# Patient Record
Sex: FEMALE | Race: BLACK OR AFRICAN AMERICAN | NOT HISPANIC OR LATINO | Employment: FULL TIME | URBAN - METROPOLITAN AREA
[De-identification: names, ages, dates, MRNs, and addresses within clinical notes are randomized per-mention and may not be internally consistent; named-entity substitution may affect disease eponyms.]

---

## 2017-11-07 ENCOUNTER — OFFICE VISIT (OUTPATIENT)
Dept: ORTHOPEDICS | Facility: CLINIC | Age: 42
End: 2017-11-07
Payer: COMMERCIAL

## 2017-11-07 ENCOUNTER — HOSPITAL ENCOUNTER (OUTPATIENT)
Dept: RADIOLOGY | Facility: HOSPITAL | Age: 42
Discharge: HOME | End: 2017-11-07
Attending: ORTHOPAEDIC SURGERY
Payer: COMMERCIAL

## 2017-11-07 VITALS
DIASTOLIC BLOOD PRESSURE: 72 MMHG | HEART RATE: 63 BPM | HEIGHT: 66 IN | SYSTOLIC BLOOD PRESSURE: 122 MMHG | WEIGHT: 158 LBS | BODY MASS INDEX: 25.39 KG/M2

## 2017-11-07 DIAGNOSIS — M25.531 RIGHT WRIST PAIN: ICD-10-CM

## 2017-11-07 DIAGNOSIS — M77.11 RIGHT TENNIS ELBOW: Primary | ICD-10-CM

## 2017-11-07 DIAGNOSIS — M25.531 RIGHT WRIST PAIN: Primary | ICD-10-CM

## 2017-11-07 PROCEDURE — 99214 OFFICE O/P EST MOD 30 MIN: CPT | Mod: S$GLB,,, | Performed by: ORTHOPAEDIC SURGERY

## 2017-11-07 PROCEDURE — 99999 PR PBB SHADOW E&M-EST. PATIENT-LVL III: CPT | Mod: PBBFAC,,, | Performed by: ORTHOPAEDIC SURGERY

## 2017-11-07 NOTE — PROGRESS NOTES
CC:Jovana is a 42 year old female that complains of right elbow pain.     HPI: The patient states that she has pain in the right elbow when extending her wrist.  The area of greatest pain is over the outer part of her right elbow.  She does experience periodic pain over the cubital tunnel area.  The patient did get great relief following the cubital tunnel release.    PMH:    Past Medical History:   Diagnosis Date    Anxiety     Arthralgia     Arthritis     Arthritis     osteo , and psoria    Diabetes mellitus type II     Panic attack        PSH:    Past Surgical History:   Procedure Laterality Date    PARTIAL HYSTERECTOMY      PELVIC LAPAROSCOPY      ROTATOR CUFF REPAIR      TUBAL LIGATION         Family Hx:    Family History   Problem Relation Age of Onset    Cancer Maternal Aunt     Cancer Maternal Grandfather        Allergy:    Review of patient's allergies indicates:   Allergen Reactions    Demerol [meperidine] Itching     Not able to urinate    Lortab [hydrocodone-acetaminophen] Itching     Hallucination       Medication:    Current Outpatient Prescriptions:     alprazolam (XANAX) 2 MG Tab, Take 0.5 mg by mouth nightly as needed. , Disp: , Rfl:     ibuprofen (ADVIL,MOTRIN) 600 MG tablet, Take 600 mg by mouth every 6 (six) hours as needed., Disp: , Rfl:     ibuprofen (ADVIL,MOTRIN) 800 MG tablet, , Disp: , Rfl: 1    meperidine (DEMEROL) 50 mg tablet, Take 1 tablet (50 mg total) by mouth every 4 (four) hours as needed for Pain., Disp: 40 tablet, Rfl: 0    nabumetone (RELAFEN) 500 MG tablet, Take 1 tablet (500 mg total) by mouth once daily., Disp: 30 tablet, Rfl: 2    oxycodone-acetaminophen  mg (PERCOCET)  mg per tablet, Take 1 tablet by mouth every 4 (four) hours as needed for Pain., Disp: 60 tablet, Rfl: 0    Social History:    Social History     Social History    Marital status:      Spouse name: N/A    Number of children: N/A    Years of education: N/A     Occupational  "History    Not on file.     Social History Main Topics    Smoking status: Never Smoker    Smokeless tobacco: Never Used    Alcohol use Yes      Comment: Occasionally    Drug use: No    Sexual activity: Not on file     Other Topics Concern    Not on file     Social History Narrative    No narrative on file       Vitals:   /72   Pulse 63   Ht 5' 6" (1.676 m)   Wt 71.7 kg (158 lb)   BMI 25.50 kg/m²      ROS:  GENERAL: No fever, chills, fatigability or weight loss.  SKIN: No rashes, itching or changes in color or texture of skin.  HEAD: No headaches or recent head trauma.  EYES: Visual acuity fine. No photophobia, ocular pain or diplopia.  EARS: Denies ear pain, discharge or vertigo.  NOSE: No loss of smell, no epistaxis or postnasal drip.  MOUTH & THROAT: No hoarseness or change in voice. No excessive gum bleeding.  NODES: Denies swollen glands.  CHEST: Denies COURTNEY, cyanosis, wheezing, cough and sputum production.  CARDIOVASCULAR: Denies chest pain, PND, orthopnea or reduced exercise tolerance.  ABDOMEN: Appetite fine. No weight loss. Denies diarrhea, abdominal pain, hematemesis or blood in stool.  URINARY: No flank pain, dysuria or hematuria.  PERIPHERAL VASCULAR: No claudication or cyanosis.  NEUROLOGIC: No history of seizures, paralysis, alteration of gait or coordination.  MUSCULOSKELETAL: See HPI    PE:  APPEARANCE: Well nourished, well developed, in no acute distress.   HEAD: Normocephalic, atraumatic.  EYES: PERRL. EOMI.   EARS: TM's intact. Light reflex normal. No retraction or perforation.   NOSE: Mucosa pink. Airway clear.  MOUTH & THROAT: No tonsillar enlargement. No pharyngeal erythema or exudate. No stridor.  NECK: Supple.   NODES: No cervical, axillary or inguinal lymph node enlargement.  CHEST: Lungs clear to auscultation.  CARDIOVASCULAR: Normal S1, S2. No rubs, murmurs or gallops.  ABDOMEN: Bowel sounds normal. Not distended. Soft. No tenderness or masses.  NEUROLOGIC: Cranial Nerves: " II-XII grossly intact, also see MUSCULOSKELETAL  MUSCULOSKELETAL:          Right Elbow- 2 plus radial  artery and ulnar artery pulses, light touch intact Right upper extremity.  All digits are warm. No erythema, no warmth, no drainage, No swelling,   Tenderness over the lateral epicondyle, pain in the lateral epicondyle with resisted wrist extension..      Assessment:           Diagnosis:              1.right lateral epicondylitis                    Diagnostic Studies  MRI-No  X-Ray-No  EMG/NCV-No  Arthrogram-No  Bone Scan-No  CT Scan-No  Doppler-No  ESR-No  CRP-No  CBC with Diff-No   Rheumatoid/Arthritis Panel-No      Plan:                                                 1. PT-no                                                 2.OT-yes                                          3.NSAID-yes                                        4. Narcotics-no                                     5. Wound care-No                                 6. Rest-yes                                           7. Surgery-no                                         8. SHARYN Hose-no                                    9. Anticoagulation therapy-no               10. Elevation-no                                     11. Crutches-no                                    12. Walker-no             13. Cane no                        14. Referral-no                                     15.Injection-no                            16. Splint   /    Cast   /   Cast Shoe-Yes              17. RICE -none            18. Follow up- 3 weeks

## 2017-11-07 NOTE — LETTER
November 7, 2017      [facility]  [address] [facility] [place] [address]  Phone: [phone]  Fax: [phone]       Patient: Fransisca Clifton   YOB: 1975  Date of Visit: 11/07/2017    To Whom It May Concern:    Marito Clifton  was at [facility] on 11/07/2017. She may return to work on 11/8/2017 with no restrictions. If you have any questions or concerns, or if I can be of further assistance, please do not hesitate to contact me.    Sincerely,    Alistair Vincent MD

## 2017-11-07 NOTE — PATIENT INSTRUCTIONS
Tendonitis  A tendon is the thick fibrous cord that joins muscle to bone and allows joints to move. When a tendon becomes inflamed, it is called tendonitis. This can occur from overuse, injury, or infection. This usually involves the shoulders, forearm, wrist, hands and foot. Symptoms include pain, swelling and tenderness to the touch. Moving the joint increases the pain.  It takes 4 to 6 weeks for tendonitis to heal. It is treated by preventing motion of the tendon with a splint or brace and the use of anti-inflammatory medicine.  Home care  · Some people find relief with ice packs. These can be crushed or cubed ice in a plastic bag or a bag of frozen vegetables wrapped in a thin towel. Other people get better relief with heat. This can include a hot shower, hot bath, or a moist towel warmed in a microwave. Try each and use the method that feels best, for 15 to 20 minutes several times a day.  · Rest the inflamed joint and protect it from movement.  · You may use over-the-counter ibuprofen or naproxen to treat pain and inflammation, unless another medicine was prescribed. If you can't take these medicines, acetaminophen may help with the pain, but does not treat inflammation. If you have chronic liver or kidney disease or ever had a stomach ulcer or gastrointestinal bleeding, talk with your doctor before using these medicines.  · As your symptoms improve, begin gradual motion at the involved joint.  Follow-up care  Follow up with your healthcare provider if you are not improving after 5 days of treatment.  When to seek medical advice  Call your healthcare provider right away if any of these occur:  · Redness over the painful area  · Increasing pain or swelling at the joint  · Fever (1 degree above your normal temperature) lasting 24 to 48 hours Or, whatever your healthcare provider told you to report based on your condition  Date Last Reviewed: 11/21/2015  © 9236-4772 [employer]. [address] [place] [address]  Road, [place] [address]. All rights reserved. This information is not intended as a substitute for professional medical care. Always follow your healthcare professional's instructions.

## 2017-11-17 ENCOUNTER — TELEPHONE (OUTPATIENT)
Dept: RADIOLOGY | Facility: HOSPITAL | Age: 42
End: 2017-11-17

## 2017-11-18 ENCOUNTER — HOSPITAL ENCOUNTER (OUTPATIENT)
Dept: RADIOLOGY | Facility: HOSPITAL | Age: 42
Discharge: HOME | End: 2017-11-18
Attending: ORTHOPAEDIC SURGERY
Payer: COMMERCIAL

## 2017-11-18 DIAGNOSIS — M25.531 RIGHT WRIST PAIN: ICD-10-CM

## 2017-11-18 PROCEDURE — 73221 MRI JOINT UPR EXTREM W/O DYE: CPT | Mod: 26,RT,, | Performed by: RADIOLOGY

## 2017-11-18 PROCEDURE — 73221 MRI JOINT UPR EXTREM W/O DYE: CPT | Mod: TC,PO,RT

## 2017-11-28 ENCOUNTER — OFFICE VISIT (OUTPATIENT)
Dept: ORTHOPEDICS | Facility: CLINIC | Age: 42
End: 2017-11-28
Payer: COMMERCIAL

## 2017-11-28 DIAGNOSIS — Z01.818 PRE-OP TESTING: Primary | ICD-10-CM

## 2017-11-28 DIAGNOSIS — G56.01 CARPAL TUNNEL SYNDROME OF RIGHT WRIST: Primary | ICD-10-CM

## 2017-11-28 DIAGNOSIS — M77.11 RIGHT LATERAL EPICONDYLITIS: ICD-10-CM

## 2017-11-28 DIAGNOSIS — G56.00 CARPAL TUNNEL SYNDROME, UNSPECIFIED LATERALITY: ICD-10-CM

## 2017-11-28 DIAGNOSIS — G56.21 ULNAR TUNNEL SYNDROME OF RIGHT WRIST: ICD-10-CM

## 2017-11-28 DIAGNOSIS — M67.40 GANGLION CYST: ICD-10-CM

## 2017-11-28 DIAGNOSIS — G56.00 CARPAL TUNNEL SYNDROME, UNSPECIFIED LATERALITY: Primary | ICD-10-CM

## 2017-11-28 DIAGNOSIS — G56.20 ULNAR NERVE ENTRAPMENT, UNSPECIFIED LATERALITY: ICD-10-CM

## 2017-11-28 PROCEDURE — 99214 OFFICE O/P EST MOD 30 MIN: CPT | Mod: S$GLB,,, | Performed by: ORTHOPAEDIC SURGERY

## 2017-11-28 PROCEDURE — 99999 PR PBB SHADOW E&M-EST. PATIENT-LVL II: CPT | Mod: PBBFAC,,, | Performed by: ORTHOPAEDIC SURGERY

## 2017-11-28 RX ORDER — MELOXICAM 7.5 MG/1
7.5 TABLET ORAL DAILY
COMMUNITY
Start: 2017-08-16 | End: 2018-08-16

## 2017-11-28 RX ORDER — FLUCONAZOLE 150 MG/1
TABLET ORAL
Refills: 0 | COMMUNITY
Start: 2017-08-21 | End: 2022-02-02

## 2017-11-28 RX ORDER — ERGOCALCIFEROL 1.25 MG/1
50000 CAPSULE ORAL
COMMUNITY
Start: 2015-05-05

## 2017-11-28 RX ORDER — VALACYCLOVIR HYDROCHLORIDE 500 MG/1
500 TABLET, FILM COATED ORAL
COMMUNITY
Start: 2015-11-30 | End: 2022-02-02

## 2017-11-28 RX ORDER — ACETAMINOPHEN AND CODEINE PHOSPHATE 300; 60 MG/1; MG/1
TABLET ORAL EVERY 12 HOURS PRN
COMMUNITY
Start: 2017-01-30 | End: 2022-12-28

## 2017-11-28 RX ORDER — METRONIDAZOLE 500 MG/1
500 TABLET ORAL 2 TIMES DAILY PRN
Refills: 0 | COMMUNITY
Start: 2017-08-21

## 2017-11-28 RX ORDER — ALPRAZOLAM 2 MG/1
1 TABLET ORAL NIGHTLY PRN
COMMUNITY

## 2017-11-28 NOTE — PROGRESS NOTES
CC:Jovana is a 42 year old female that complains of right elbow, right wrist and hand pain.     HPI: The patient states that she has pain in the right elbow when extending her wrist.  The area of greatest pain is over the outer part of her right elbow.  She does experience periodic pain over the cubital tunnel area.  The patient did get great relief following the cubital tunnel release.     PMH:    Past Medical History:   Diagnosis Date    Anxiety     Arthralgia     Arthritis     Arthritis     osteo , and psoria    Diabetes mellitus type II     Panic attack        PSH:    Past Surgical History:   Procedure Laterality Date    PARTIAL HYSTERECTOMY      PELVIC LAPAROSCOPY      ROTATOR CUFF REPAIR      TUBAL LIGATION         Family Hx:    Family History   Problem Relation Age of Onset    Cancer Maternal Aunt     Cancer Maternal Grandfather        Allergy:    Review of patient's allergies indicates:   Allergen Reactions    Demerol [meperidine] Itching     Not able to urinate    Lortab [hydrocodone-acetaminophen] Itching     Hallucination       Medication:    Current Outpatient Prescriptions:     acetaminophen-codeine 300-60mg (TYLENOL #4) 300-60 mg Tab, Take by mouth every 12 (twelve) hours as needed., Disp: , Rfl:     alprazolam (XANAX) 2 MG Tab, Take 0.5 mg by mouth nightly as needed. , Disp: , Rfl:     ALPRAZolam (XANAX) 2 MG Tab, Take 1 mg by mouth nightly as needed., Disp: , Rfl:     ergocalciferol (ERGOCALCIFEROL) 50,000 unit Cap, Take 50,000 Units by mouth every 30 days., Disp: , Rfl:     fluconazole (DIFLUCAN) 150 MG Tab, Take by mouth as needed., Disp: , Rfl: 0    meloxicam (MOBIC) 7.5 MG tablet, Take 7.5 mg by mouth once daily., Disp: , Rfl:     metroNIDAZOLE (FLAGYL) 500 MG tablet, Take 500 mg by mouth 2 (two) times daily as needed., Disp: , Rfl: 0    nabumetone (RELAFEN) 500 MG tablet, Take 1 tablet (500 mg total) by mouth once daily., Disp: 30 tablet, Rfl: 2    valACYclovir (VALTREX) 500  MG tablet, Take 500 mg by mouth as needed., Disp: , Rfl:     Social History:    Social History     Social History    Marital status:      Spouse name: N/A    Number of children: N/A    Years of education: N/A     Occupational History    Not on file.     Social History Main Topics    Smoking status: Never Smoker    Smokeless tobacco: Never Used    Alcohol use Yes      Comment: Occasionally    Drug use: No    Sexual activity: Not on file     Other Topics Concern    Not on file     Social History Narrative    No narrative on file       Vitals:   There were no vitals taken for this visit.     ROS:  GENERAL: No fever, chills, fatigability or weight loss.  SKIN: No rashes, itching or changes in color or texture of skin.  HEAD: No headaches or recent head trauma.  EYES: Visual acuity fine. No photophobia, ocular pain or diplopia.  EARS: Denies ear pain, discharge or vertigo.  NOSE: No loss of smell, no epistaxis or postnasal drip.  MOUTH & THROAT: No hoarseness or change in voice. No excessive gum bleeding.  NODES: Denies swollen glands.  CHEST: Denies COURTNEY, cyanosis, wheezing, cough and sputum production.  CARDIOVASCULAR: Denies chest pain, PND, orthopnea or reduced exercise tolerance.  ABDOMEN: Appetite fine. No weight loss. Denies diarrhea, abdominal pain, hematemesis or blood in stool.  URINARY: No flank pain, dysuria or hematuria.  PERIPHERAL VASCULAR: No claudication or cyanosis.  NEUROLOGIC: No history of seizures, paralysis, alteration of gait or coordination.  MUSCULOSKELETAL: See HPI    PE:  APPEARANCE: Well nourished, well developed, in no acute distress.   HEAD: Normocephalic, atraumatic.  EYES: PERRL. EOMI.   EARS: TM's intact. Light reflex normal. No retraction or perforation.   NOSE: Mucosa pink. Airway clear.  MOUTH & THROAT: No tonsillar enlargement. No pharyngeal erythema or exudate. No stridor.  NECK: Supple.   NODES: No cervical, axillary or inguinal lymph node enlargement.  CHEST:  Lungs clear to auscultation.  CARDIOVASCULAR: Normal S1, S2. No rubs, murmurs or gallops.  ABDOMEN: Bowel sounds normal. Not distended. Soft. No tenderness or masses.  NEUROLOGIC: Cranial Nerves: II-XII grossly intact, also see MUSCULOSKELETAL  MUSCULOSKELETAL:          Right Elbow- 2 plus radial  artery and ulnar artery pulses, light touch intact Right upper extremity.  All digits are warm. No erythema, no warmth, no drainage, No swelling,   Tenderness over the lateral epicondyle, pain in the lateral epicondyle with resisted wrist extension..      Right  Wrist hand-      - Positive- Tinel's over the Median nerve  Positive- Phalen maneuver   Positive- Thenar atrophy   Negative- hypothenar atrophy   Positive- Median Nerve Compression test less than 30 seconds   Negative- Tinel's over Guyon's Canal   Negative- Tinel's over Cubital Tunnel Negative- Tinels over the Median Nerve overlying the antecubital  Fossa   Negative- Tinel's over Radial groove  Negative- Tinel's over sensory branch of Radial nerve at the wrist  Negative- Tinel's over the PIN   Negative- pain in forearm with resistance to             FDP flexion and resisted pronation   Positive- boggy synovium of the wrist   normal- less than 2 seconds capillary all digits  Positive- light touch intact in Median nerve distribution Positive- light touch in the Radial Nerve distribution  Positive- light touch intact in the Ulnar sensory nerve distribution    Positive- Thumb opposition strength symmetrical  Negative- bruit(aneurysm)    Positive- skin color intact(claudication/Raynaud's)  Negative- cold digits(claudication/Raynaud's)  normal - Kamron Test(Vascular Exam)  normal- +2 Radial and Ulnar artery pulses  Negative- anatomical snuff box tenderness(Dequervain's Synovitis)  Negative- finger or thumb triggering(Trigger Thumb or Finger)  Negative- Lipoma  Negative- Ganglion cyst  Positive-Tinel's ulnar tunnel      Sensory exam-C5,C6,C7,C8,T1- normal    Wrist  extension for radial nerve- +5/5  Wrist Flexion-+5/5  Wrist Ulnar Deviation-+5/5  Wrist Radial Deviation- +5/5  Resisted opposition of the thumb for the median nerve- +5/5  Digit abduction for the ulnar nerve- +5/5                 Assessment:  All the patient's questions were answered regarding her diagnosis and recommended treatment.  The patient understands that she would benefit from surgical intervention.  The risk and benefit of surgery has been discussed.           Diagnosis:              1.right lateral epicondylitis                2. Volar radial ganglion cyst                 3. Right carpal tunnel  syndrome                 4.  Right ulnar tunnel syndrome    Diagnostic Studies  MRI-No  X-Ray-No  EMG/NCV-yes, right upper extremity    Arthrogram-No  Bone Scan-No  CT Scan-No  Doppler-No  ESR-No  CRP-No  CBC with Diff-No   Rheumatoid/Arthritis Panel-No      Plan:                                                 1. PT-no                                                 2.OT-yes                                          3.NSAID-yes                                        4. Narcotics-no                                     5. Wound care-No                                 6. Rest-yes                                           7. Surgery-yes, right carpal tunnel release, volar ganglion cyst excision.                                         8. SHARYN Hose-no                                    9. Anticoagulation therapy-no               10. Elevation-no                                     11. Crutches-no                                    12. Walker-no             13. Cane no                        14. Referral-no                                     15.Injection-no                            16. Splint   /    Cast   /   Cast Shoe-Yes , custom splint for the right upper extremity as per occupational therapist             17. RICE -none            18. Follow up- 3 weeks

## 2017-11-28 NOTE — LETTER
November 28, 2017      Rip Junior MD  [address]  [place] [address]           [facility]  [address]  [place] [address]  Phone: [phone]  Fax: [phone]          Patient: Fransisca Clifton   MR Number: [id number]   YOB: 1975   Date of Visit: 11/28/2017       Dear Dr. Rip Junior:    Thank you for referring Fransisca Clifton to me for evaluation. Attached you will find relevant portions of my assessment and plan of care.    If you have questions, please do not hesitate to call me. I look forward to following Fransisca Clifton along with you.    Sincerely,    Alistair Vincent Sr., MD    Enclosure  CC:  No Recipients    If you would like to receive this communication electronically, please contact [email or web address] or [phone] to request more information on [employer] access.    For providers and/or their staff who would like to refer a patient to [facility], please contact us through our one-stop-shop provider referral line, [facility], at [phone].    If you feel you have received this communication in error or would no longer like to receive these types of communications, please e-mail [email or web address]

## 2017-11-29 ENCOUNTER — TELEPHONE (OUTPATIENT)
Dept: ORTHOPEDICS | Facility: CLINIC | Age: 42
End: 2017-11-29

## 2017-11-29 NOTE — TELEPHONE ENCOUNTER
Returned pt phone call. Pt had questions regarding upcoming surgery. All questions were answered. Pt vocalized understanding.

## 2017-11-29 NOTE — TELEPHONE ENCOUNTER
----- Message from Miladys Higginbotham sent at 11/29/2017  3:36 PM CST -----  Contact: Patient   Patient has some question about her procedure, Please call her at [phone].    Thanks  td

## 2017-12-11 ENCOUNTER — TELEPHONE (OUTPATIENT)
Dept: ORTHOPEDICS | Facility: CLINIC | Age: 42
End: 2017-12-11

## 2017-12-12 ENCOUNTER — HOSPITAL ENCOUNTER (OUTPATIENT)
Dept: RADIOLOGY | Facility: HOSPITAL | Age: 42
Discharge: HOME | End: 2017-12-12
Attending: ORTHOPAEDIC SURGERY
Payer: COMMERCIAL

## 2017-12-12 ENCOUNTER — CLINICAL SUPPORT (OUTPATIENT)
Dept: CARDIOLOGY | Facility: CLINIC | Age: 42
End: 2017-12-12
Payer: COMMERCIAL

## 2017-12-12 ENCOUNTER — TELEPHONE (OUTPATIENT)
Dept: ORTHOPEDICS | Facility: CLINIC | Age: 42
End: 2017-12-12

## 2017-12-12 DIAGNOSIS — Z01.818 PRE-OP TESTING: ICD-10-CM

## 2017-12-12 PROCEDURE — 93000 ELECTROCARDIOGRAM COMPLETE: CPT | Mod: S$GLB,,, | Performed by: INTERNAL MEDICINE

## 2017-12-12 PROCEDURE — 71020 XR CHEST PA AND LATERAL PRE-OP: CPT | Mod: 26,,, | Performed by: RADIOLOGY

## 2017-12-12 PROCEDURE — 71020 XR CHEST PA AND LATERAL PRE-OP: CPT | Mod: TC,PO

## 2017-12-12 NOTE — TELEPHONE ENCOUNTER
I have spoken with the patient, who has been made aware that her paper work is only awaiting a signature today by Dr. Vincent. The patient will be contacted after paperwork has been signed and faxed to employer. Pt verbalized understanding. -AS

## 2017-12-14 ENCOUNTER — TELEPHONE (OUTPATIENT)
Dept: ORTHOPEDICS | Facility: CLINIC | Age: 42
End: 2017-12-14

## 2017-12-14 ENCOUNTER — PATIENT MESSAGE (OUTPATIENT)
Dept: SURGERY | Facility: HOSPITAL | Age: 42
End: 2017-12-14

## 2017-12-14 NOTE — TELEPHONE ENCOUNTER
----- Message from Milena Burger sent at 12/14/2017  2:26 PM CST -----  Contact: Patient  Patient has a question about upcoming surgery and test results, please call her back at [phone]. Thank you

## 2017-12-15 ENCOUNTER — PATIENT MESSAGE (OUTPATIENT)
Dept: SURGERY | Facility: HOSPITAL | Age: 42
End: 2017-12-15

## 2017-12-18 ENCOUNTER — TELEPHONE (OUTPATIENT)
Dept: ORTHOPEDICS | Facility: CLINIC | Age: 42
End: 2017-12-18

## 2017-12-18 NOTE — TELEPHONE ENCOUNTER
Called pt to schedule nurse visit to sign consents for upcoming surgery. Pt vocalized understanding.

## 2017-12-18 NOTE — TELEPHONE ENCOUNTER
----- Message from Miladys Higginbotham sent at 12/18/2017  8:24 AM CST -----  Contact: Patient   Patient request a call back at [phone], Regards to her surgery.    Thanks  td

## 2017-12-20 NOTE — PRE ADMISSION SCREENING
Pre op instructions reviewed with patient per phone:    To confirm, Your surgeon has instructed you:  Surgery is scheduled 12/22/17 at 1215.      Please report to [facility] 1[facility] floor main lobby by 1045.   Pre admit office to call afternoon prior to surgery with final arrival time      INSTRUCTIONS IMPORTANT!!!  ¨ Do not eat, drink, or smoke after 12 midnight-including water. OK to brush teeth, no gum, candy or mints!    ¨ Take only these medicines with a small swallow of water-morning of surgery.  N/A      ____  Do not wear makeup, including mascara.  ____  No powder, lotions or creams to surgical area.  ____  Please remove all jewelry, including piercings and leave at home.  ____  No money or valuables needed. Please leave at home.  ____  Please bring identification and insurance information to hospital.  ____  If going home the same day, arrange for a ride home. You will not be able to   drive if Anesthesia was used.  ____  Children, under 12 years old, must remain in the waiting room with an adult.  They are not allowed in patient areas.  ____  Wear loose fitting clothing. Allow for dressings, bandages.  ____  Stop Aspirin, Ibuprofen, Motrin and Aleve at least 5-7 days before surgery, unless otherwise instructed by your doctor, or the nurse.   You MAY use Tylenol/acetaminophen until day of surgery.  ____  If you take diabetic medication, do not take am of surgery unless instructed by   Doctor.  ____ Stop taking any Fish Oil supplement or any Vitamins that contain Vitamin E at least 5 days prior to surgery.          Bathing Instructions-- The night before surgery and the morning prior to coming to the hospital:   -Do not shave the surgical area.   -Shower and wash your hair and body as usual with anti-bacterial  soap and shampoo.   -Rinse your hair and body completely.   -Use one packet of hibiclens to wash the surgical site (using your hand) gently for 5 minutes.  Do not scrub you skin  too hard.   -Do not use hibiclens on your head, face, or genitals.   -Do not wash with anti-bacterial soap after you use the hibiclens.   -Rinse your body thoroughly.   -Dry with clean, soft towel.  Do not use lotion, cream, deodorant, or powders on   the surgical site.    Use antibacterial soap in place of hibiclens if your surgery is on the head, face or genitals.         Surgical Site Infection    Prevention of surgical site infections:     -Keep incisions clean and dry.   -Do not soak/submerge incisions in water until completely healed.   -Do not apply lotions, powders, creams, or deodorants to site.   -Always make sure hands are cleaned with antibacterial soap/ alcohol-based   prior to touching the surgical site.  (This includes doctors, nurses, staff, and yourself.)    Signs and symptoms:   -Redness and pain around the area where you had surgery   -Drainage of cloudy fluid from your surgical wound   -Fever over 100.4  I have read or had read and explained to me, and understand the above information.

## 2017-12-22 ENCOUNTER — ANESTHESIA (OUTPATIENT)
Dept: SURGERY | Facility: HOSPITAL | Age: 42
End: 2017-12-22
Payer: COMMERCIAL

## 2017-12-22 ENCOUNTER — ANESTHESIA EVENT (OUTPATIENT)
Dept: SURGERY | Facility: HOSPITAL | Age: 42
End: 2017-12-22
Payer: COMMERCIAL

## 2017-12-22 ENCOUNTER — HOSPITAL ENCOUNTER (OUTPATIENT)
Facility: HOSPITAL | Age: 42
Discharge: HOME | End: 2017-12-22
Attending: ORTHOPAEDIC SURGERY | Admitting: ORTHOPAEDIC SURGERY
Payer: COMMERCIAL

## 2017-12-22 DIAGNOSIS — M67.40 GANGLION CYST: Primary | ICD-10-CM

## 2017-12-22 DIAGNOSIS — M67.431 GANGLION CYST OF WRIST, RIGHT: ICD-10-CM

## 2017-12-22 LAB
POCT GLUCOSE: 69 MG/DL (ref 70–110)
POCT GLUCOSE: 89 MG/DL (ref 70–110)

## 2017-12-22 PROCEDURE — 37000008 HC ANESTHESIA 1ST 15 MINUTES: Performed by: ORTHOPAEDIC SURGERY

## 2017-12-22 PROCEDURE — 64721 CARPAL TUNNEL SURGERY: CPT | Mod: 51,22,RT, | Performed by: ORTHOPAEDIC SURGERY

## 2017-12-22 PROCEDURE — 26160 REMOVE TENDON SHEATH LESION: CPT | Mod: 51,RT,, | Performed by: ORTHOPAEDIC SURGERY

## 2017-12-22 PROCEDURE — 63600175 PHARM REV CODE 636 W HCPCS: Performed by: ORTHOPAEDIC SURGERY

## 2017-12-22 PROCEDURE — 63600175 PHARM REV CODE 636 W HCPCS: Performed by: ANESTHESIOLOGY

## 2017-12-22 PROCEDURE — 36000707: Performed by: ORTHOPAEDIC SURGERY

## 2017-12-22 PROCEDURE — C1729 CATH, DRAINAGE: HCPCS | Performed by: ORTHOPAEDIC SURGERY

## 2017-12-22 PROCEDURE — 36000706: Performed by: ORTHOPAEDIC SURGERY

## 2017-12-22 PROCEDURE — 71000039 HC RECOVERY, EACH ADD'L HOUR: Performed by: ORTHOPAEDIC SURGERY

## 2017-12-22 PROCEDURE — 63600175 PHARM REV CODE 636 W HCPCS: Performed by: NURSE ANESTHETIST, CERTIFIED REGISTERED

## 2017-12-22 PROCEDURE — 64718 REVISE ULNAR NERVE AT ELBOW: CPT | Mod: RT,,, | Performed by: ORTHOPAEDIC SURGERY

## 2017-12-22 PROCEDURE — 88305 TISSUE EXAM BY PATHOLOGIST: CPT | Performed by: PATHOLOGY

## 2017-12-22 PROCEDURE — 25000003 PHARM REV CODE 250: Performed by: ANESTHESIOLOGY

## 2017-12-22 PROCEDURE — 88305 TISSUE EXAM BY PATHOLOGIST: CPT | Mod: 26,,, | Performed by: PATHOLOGY

## 2017-12-22 PROCEDURE — 88304 TISSUE EXAM BY PATHOLOGIST: CPT | Mod: 26,,, | Performed by: PATHOLOGY

## 2017-12-22 PROCEDURE — 71000015 HC POSTOP RECOV 1ST HR: Performed by: ORTHOPAEDIC SURGERY

## 2017-12-22 PROCEDURE — 71000033 HC RECOVERY, INTIAL HOUR: Performed by: ORTHOPAEDIC SURGERY

## 2017-12-22 PROCEDURE — 25000003 PHARM REV CODE 250: Performed by: ORTHOPAEDIC SURGERY

## 2017-12-22 PROCEDURE — 64718 REVISE ULNAR NERVE AT ELBOW: CPT | Mod: AS,RT,, | Performed by: PHYSICIAN ASSISTANT

## 2017-12-22 PROCEDURE — 37000009 HC ANESTHESIA EA ADD 15 MINS: Performed by: ORTHOPAEDIC SURGERY

## 2017-12-22 RX ORDER — LIDOCAINE HCL/PF 100 MG/5ML
SYRINGE (ML) INTRAVENOUS
Status: DISCONTINUED | OUTPATIENT
Start: 2017-12-22 | End: 2017-12-22

## 2017-12-22 RX ORDER — SODIUM CHLORIDE, SODIUM LACTATE, POTASSIUM CHLORIDE, CALCIUM CHLORIDE 600; 310; 30; 20 MG/100ML; MG/100ML; MG/100ML; MG/100ML
INJECTION, SOLUTION INTRAVENOUS CONTINUOUS
Status: DISCONTINUED | OUTPATIENT
Start: 2017-12-22 | End: 2017-12-22 | Stop reason: HOSPADM

## 2017-12-22 RX ORDER — ACETAMINOPHEN 10 MG/ML
INJECTION, SOLUTION INTRAVENOUS
Status: DISCONTINUED | OUTPATIENT
Start: 2017-12-22 | End: 2017-12-22

## 2017-12-22 RX ORDER — BUPIVACAINE HYDROCHLORIDE 2.5 MG/ML
INJECTION, SOLUTION EPIDURAL; INFILTRATION; INTRACAUDAL
Status: DISCONTINUED | OUTPATIENT
Start: 2017-12-22 | End: 2017-12-22 | Stop reason: HOSPADM

## 2017-12-22 RX ORDER — MEPERIDINE HYDROCHLORIDE 50 MG/1
50 TABLET ORAL EVERY 4 HOURS PRN
Qty: 60 TABLET | Refills: 0 | Status: SHIPPED | OUTPATIENT
Start: 2017-12-22 | End: 2019-03-28

## 2017-12-22 RX ORDER — LIDOCAINE HYDROCHLORIDE 10 MG/ML
INJECTION, SOLUTION EPIDURAL; INFILTRATION; INTRACAUDAL; PERINEURAL
Status: DISCONTINUED | OUTPATIENT
Start: 2017-12-22 | End: 2017-12-22 | Stop reason: HOSPADM

## 2017-12-22 RX ORDER — PROPOFOL 10 MG/ML
VIAL (ML) INTRAVENOUS
Status: DISCONTINUED | OUTPATIENT
Start: 2017-12-22 | End: 2017-12-22

## 2017-12-22 RX ORDER — CHLORHEXIDINE GLUCONATE ORAL RINSE 1.2 MG/ML
10 SOLUTION DENTAL 2 TIMES DAILY
Status: DISCONTINUED | OUTPATIENT
Start: 2017-12-22 | End: 2017-12-22 | Stop reason: HOSPADM

## 2017-12-22 RX ORDER — CEFAZOLIN SODIUM 2 G/50ML
2 SOLUTION INTRAVENOUS
Status: DISCONTINUED | OUTPATIENT
Start: 2017-12-22 | End: 2017-12-22 | Stop reason: HOSPADM

## 2017-12-22 RX ORDER — HYDROCODONE BITARTRATE AND ACETAMINOPHEN 5; 325 MG/1; MG/1
1 TABLET ORAL EVERY 4 HOURS PRN
Status: DISCONTINUED | OUTPATIENT
Start: 2017-12-22 | End: 2017-12-22 | Stop reason: HOSPADM

## 2017-12-22 RX ORDER — DIPHENHYDRAMINE HYDROCHLORIDE 50 MG/ML
25 INJECTION INTRAMUSCULAR; INTRAVENOUS EVERY 6 HOURS PRN
Status: DISCONTINUED | OUTPATIENT
Start: 2017-12-22 | End: 2017-12-22 | Stop reason: HOSPADM

## 2017-12-22 RX ORDER — ONDANSETRON 2 MG/ML
4 INJECTION INTRAMUSCULAR; INTRAVENOUS DAILY PRN
Status: DISCONTINUED | OUTPATIENT
Start: 2017-12-22 | End: 2017-12-22 | Stop reason: HOSPADM

## 2017-12-22 RX ORDER — SODIUM CHLORIDE 9 MG/ML
INJECTION, SOLUTION INTRAVENOUS CONTINUOUS
Status: DISCONTINUED | OUTPATIENT
Start: 2017-12-22 | End: 2017-12-22 | Stop reason: HOSPADM

## 2017-12-22 RX ORDER — CHLORHEXIDINE GLUCONATE ORAL RINSE 1.2 MG/ML
10 SOLUTION DENTAL
Status: DISCONTINUED | OUTPATIENT
Start: 2017-12-22 | End: 2017-12-22 | Stop reason: HOSPADM

## 2017-12-22 RX ORDER — HYDROMORPHONE HYDROCHLORIDE 1 MG/ML
0.2 INJECTION, SOLUTION INTRAMUSCULAR; INTRAVENOUS; SUBCUTANEOUS EVERY 5 MIN PRN
Status: DISCONTINUED | OUTPATIENT
Start: 2017-12-22 | End: 2017-12-22 | Stop reason: HOSPADM

## 2017-12-22 RX ORDER — MIDAZOLAM HYDROCHLORIDE 1 MG/ML
INJECTION, SOLUTION INTRAMUSCULAR; INTRAVENOUS
Status: DISCONTINUED | OUTPATIENT
Start: 2017-12-22 | End: 2017-12-22

## 2017-12-22 RX ORDER — FENTANYL CITRATE 50 UG/ML
INJECTION, SOLUTION INTRAMUSCULAR; INTRAVENOUS
Status: DISCONTINUED | OUTPATIENT
Start: 2017-12-22 | End: 2017-12-22

## 2017-12-22 RX ORDER — HYDROMORPHONE HYDROCHLORIDE 2 MG/1
2 TABLET ORAL EVERY 4 HOURS PRN
Qty: 90 TABLET | Refills: 0 | Status: SHIPPED | OUTPATIENT
Start: 2017-12-22 | End: 2019-03-28

## 2017-12-22 RX ORDER — FENTANYL CITRATE 50 UG/ML
25 INJECTION, SOLUTION INTRAMUSCULAR; INTRAVENOUS EVERY 5 MIN PRN
Status: COMPLETED | OUTPATIENT
Start: 2017-12-22 | End: 2017-12-22

## 2017-12-22 RX ADMIN — FENTANYL CITRATE 25 MCG: 50 INJECTION, SOLUTION INTRAMUSCULAR; INTRAVENOUS at 11:12

## 2017-12-22 RX ADMIN — CEFAZOLIN SODIUM 2 G: 2 SOLUTION INTRAVENOUS at 11:12

## 2017-12-22 RX ADMIN — FENTANYL CITRATE 25 MCG: 50 INJECTION INTRAMUSCULAR; INTRAVENOUS at 01:12

## 2017-12-22 RX ADMIN — ONDANSETRON 4 MG: 2 INJECTION, SOLUTION INTRAMUSCULAR; INTRAVENOUS at 02:12

## 2017-12-22 RX ADMIN — DIPHENHYDRAMINE HYDROCHLORIDE 25 MG: 50 INJECTION, SOLUTION INTRAMUSCULAR; INTRAVENOUS at 01:12

## 2017-12-22 RX ADMIN — FENTANYL CITRATE 25 MCG: 50 INJECTION, SOLUTION INTRAMUSCULAR; INTRAVENOUS at 12:12

## 2017-12-22 RX ADMIN — FENTANYL CITRATE 50 MCG: 50 INJECTION, SOLUTION INTRAMUSCULAR; INTRAVENOUS at 12:12

## 2017-12-22 RX ADMIN — SODIUM CHLORIDE, SODIUM LACTATE, POTASSIUM CHLORIDE, AND CALCIUM CHLORIDE: 600; 310; 30; 20 INJECTION, SOLUTION INTRAVENOUS at 12:12

## 2017-12-22 RX ADMIN — ACETAMINOPHEN 1000 MG: 10 INJECTION, SOLUTION INTRAVENOUS at 11:12

## 2017-12-22 RX ADMIN — PROPOFOL 150 MG: 10 INJECTION, EMULSION INTRAVENOUS at 11:12

## 2017-12-22 RX ADMIN — PROPOFOL 50 MG: 10 INJECTION, EMULSION INTRAVENOUS at 12:12

## 2017-12-22 RX ADMIN — SODIUM CHLORIDE, SODIUM LACTATE, POTASSIUM CHLORIDE, AND CALCIUM CHLORIDE: 600; 310; 30; 20 INJECTION, SOLUTION INTRAVENOUS at 10:12

## 2017-12-22 RX ADMIN — LIDOCAINE HYDROCHLORIDE 75 MG: 20 INJECTION, SOLUTION INTRAVENOUS at 11:12

## 2017-12-22 RX ADMIN — HYDROCODONE BITARTRATE AND ACETAMINOPHEN 1 TABLET: 5; 325 TABLET ORAL at 02:12

## 2017-12-22 RX ADMIN — MIDAZOLAM HYDROCHLORIDE 2 MG: 1 INJECTION, SOLUTION INTRAMUSCULAR; INTRAVENOUS at 10:12

## 2017-12-22 RX ADMIN — FENTANYL CITRATE 50 MCG: 50 INJECTION, SOLUTION INTRAMUSCULAR; INTRAVENOUS at 11:12

## 2017-12-22 RX ADMIN — HYDROMORPHONE HYDROCHLORIDE 0.2 MG: 1 INJECTION, SOLUTION INTRAMUSCULAR; INTRAVENOUS; SUBCUTANEOUS at 02:12

## 2017-12-22 NOTE — OP NOTE
OPERATIVE DICTATION:    DATE OF SERVICE: 12/22/2017      Preoperative Diagnosis:  Right hand ganglion cyst, right Carpal tunnel syndrome, right cubital tunnel syndrome, medial elbow skin defect    Postoperative Diagnosis:   Right hand ganglion cyst, right Carpal tunnel syndrome, right cubital tunnel syndrome, medial elbow skin defect, wrist sprain with capsular defect    Procedure: Right hand ganglion cyst excision, right Carpal tunnel release, right cubital tunnel release, flexor digitorum tenosynovectomy 2 through 5,  Soft tissue rearrangement of right elbow measuring 1 x 3 cm.  Capsulorrhaphy of the radiocarpal  joint    Indication for surgery:   Right hand ganglion cyst, right Carpal tunnel syndrome, right cubital tunnel syndrome, skin defect    Anesthesia:  Gen. anesthesia    Complications:  none    Surgeon: Alistair Vincent M.D.     Specimen:tenosynovium right wrist and ganglion cyst    Assistant:BRANDI Tomlinson. His assistance was critical and necesssary with positioning of the extremity throughout the surgical procedure.The physician assistant allowed me to access areas of the right upper extremity that could not be possible without the assistance of a trained orthopedic physician assistant.  His assistance was critical to allowing me to provide the highest level of care to the patient.      Operative procedure:  Right hand ganglion cyst excision, right Carpal tunnel release, right cubital tunnel release, flexor digitorum tenosynovectomy 2 through 5    OPERATIVE PROCEDURE IN DETAIL: The patient was brought to the Operating Room   after appropriate consent, placed under general anesthesia with intubation. The  patient was placed in a supine position. The Right  upper extremity prepped with  alcohol and chlorhexidine and sterilely draped. The Esmarch used for   exsanguination after the timeout was performed. The patient did receive IV   antibiotics prior to placement of the tourniquet. The Esmarch used for    exsanguination and the tourniquet inflated to 250 mmHg pressure.        Operation #1:  There was an incision overlying the cubital tunnel was reopened.  The 1 x 3 cm scar was excised.  The dissection was carried through the subcutaneous tissue, very gently.  The ulnar nerve was then used proximal to the incision site at the medial intermuscular septum.  The ulnar nerve was then released fully to its arborization.  The nerve noted to be decompressed quite well.  The patient had an abundance of scar tissue constricting the nerve.  Irrigation was performed and these overlying skin opposed with a running 3-0 nylon suture.        Operation #2:  The attention was turned to the volar aspect of the palm just distal to he   wrist crease. A curved incision was made just ulnar to the thenar crease and   distal to the wrist crease extending 3 cm. Dissection carried through the   subcutaneous tissue down to the palmar fascia, which was then incised overlying   the carpal canal. The sensory branch of median nerve was preserved. The   patient noted to have compression of the median nerve at the central portion of   the carpal canal. The patient noted to have thick and fibrotic tenosynovium   surrounding the flexor digitorum 2 through 5, which was excised and   submitted for pathological specimen. There was no evidence of vascular or mass   within the carpal canal. Irrigation was thoroughly performed.     Operation #3   The patient noted to have a cyst embedded deep to the abductor pollicis musculature.  The cyst extended down to the radiocarpal joint with a stalk perforating the capsule and creating a defect within the capsule.  The cyst was 1 x 1 cm.  It was excised en bloc and submitted for pathological specimen.  Irrigation was performed and the defect within the radiocarpal joint was repaired using interrupted 3-0 Vicryl suture.  The repair noted to be quite stable.  The overlying   skin opposed with a running 3-0 nylon  suture. Betadine applied to the incision   site. Sterile gauze applied. An Ace wrap applied to the right upper extremity.  The tourniquet deflated. The patient noted to have less than 2 seconds   capillary refill in all digits.  The patient was injected with 25% Marcaine plain and 1% Xylocaine plain.  The incision sites.  She was placed in a posterior splint.  The patient tolerated the procedure well and   left the Operating Room in good condition.    MD Alistair Turner M.D.

## 2017-12-22 NOTE — H&P
CC:This is a 42 year old female that complains of right elbow, right wrist and hand pain.             Patient continues to complain of numbness and tingling in all digits and time.  She does have numbness and tingling in the fourth and fourth fifth digit on palpation of the medial elbow over the area of the cubital tunnel.     HPI: The patient states that she has pain in the right elbow when extending her wrist.  The area of greatest pain is over the outer part of her right elbow.  She does experience periodic pain over the cubital tunnel area.  The patient did get great relief following the cubital tunnel release.  The symptoms have been persistent for over 6 months and is interfering with the patient activities of different daily living.     PMH:         Past Medical History:   Diagnosis Date    Anxiety      Arthralgia      Arthritis      Arthritis       osteo , and psoria    Diabetes mellitus type II      Panic attack           PSH:          Past Surgical History:   Procedure Laterality Date    PARTIAL HYSTERECTOMY        PELVIC LAPAROSCOPY        ROTATOR CUFF REPAIR        TUBAL LIGATION             Family Hx:          Family History   Problem Relation Age of Onset    Cancer Maternal Aunt      Cancer Maternal Grandfather           Allergy:          Review of patient's allergies indicates:   Allergen Reactions    Demerol [meperidine] Itching       Not able to urinate    Lortab [hydrocodone-acetaminophen] Itching       Hallucination         Medication:    Current Outpatient Prescriptions:     acetaminophen-codeine 300-60mg (TYLENOL #4) 300-60 mg Tab, Take by mouth every 12 (twelve) hours as needed., Disp: , Rfl:     alprazolam (XANAX) 2 MG Tab, Take 0.5 mg by mouth nightly as needed. , Disp: , Rfl:     ALPRAZolam (XANAX) 2 MG Tab, Take 1 mg by mouth nightly as needed., Disp: , Rfl:     ergocalciferol (ERGOCALCIFEROL) 50,000 unit Cap, Take 50,000 Units by mouth every 30 days., Disp: , Rfl:      fluconazole (DIFLUCAN) 150 MG Tab, Take by mouth as needed., Disp: , Rfl: 0    meloxicam (MOBIC) 7.5 MG tablet, Take 7.5 mg by mouth once daily., Disp: , Rfl:     metroNIDAZOLE (FLAGYL) 500 MG tablet, Take 500 mg by mouth 2 (two) times daily as needed., Disp: , Rfl: 0    nabumetone (RELAFEN) 500 MG tablet, Take 1 tablet (500 mg total) by mouth once daily., Disp: 30 tablet, Rfl: 2    valACYclovir (VALTREX) 500 MG tablet, Take 500 mg by mouth as needed., Disp: , Rfl:      Social History:    Social History   Social History            Social History    Marital status:        Spouse name: N/A    Number of children: N/A    Years of education: N/A          Occupational History    Not on file.             Social History Main Topics    Smoking status: Never Smoker    Smokeless tobacco: Never Used    Alcohol use Yes         Comment: Occasionally    Drug use: No    Sexual activity: Not on file           Other Topics Concern    Not on file          Social History Narrative    No narrative on file            Vitals:   There were no vitals taken for this visit.      ROS:  GENERAL: No fever, chills, fatigability or weight loss.  SKIN: No rashes, itching or changes in color or texture of skin.  HEAD: No headaches or recent head trauma.  EYES: Visual acuity fine. No photophobia, ocular pain or diplopia.  EARS: Denies ear pain, discharge or vertigo.  NOSE: No loss of smell, no epistaxis or postnasal drip.  MOUTH & THROAT: No hoarseness or change in voice. No excessive gum bleeding.  NODES: Denies swollen glands.  CHEST: Denies COURTNEY, cyanosis, wheezing, cough and sputum production.  CARDIOVASCULAR: Denies chest pain, PND, orthopnea or reduced exercise tolerance.  ABDOMEN: Appetite fine. No weight loss. Denies diarrhea, abdominal pain, hematemesis or blood in stool.  URINARY: No flank pain, dysuria or hematuria.  PERIPHERAL VASCULAR: No claudication or cyanosis.  NEUROLOGIC: No history of seizures, paralysis,  alteration of gait or coordination.  MUSCULOSKELETAL: See HPI     PE:  APPEARANCE: Well nourished, well developed, in no acute distress.   HEAD: Normocephalic, atraumatic.  EYES: PERRL. EOMI.   EARS: TM's intact. Light reflex normal. No retraction or perforation.   NOSE: Mucosa pink. Airway clear.  MOUTH & THROAT: No tonsillar enlargement. No pharyngeal erythema or exudate. No stridor.  NECK: Supple.   NODES: No cervical, axillary or inguinal lymph node enlargement.  CHEST: Lungs clear to auscultation.  CARDIOVASCULAR: Normal S1, S2. No rubs, murmurs or gallops.  ABDOMEN: Bowel sounds normal. Not distended. Soft. No tenderness or masses.  NEUROLOGIC: Cranial Nerves: II-XII grossly intact, also see MUSCULOSKELETAL  MUSCULOSKELETAL:           Right Elbow- 2 plus radial  artery and ulnar artery pulses, light touch intact Right upper extremity.  All digits are warm. No erythema, no warmth, no drainage, No swelling,   Tenderness over the lateral epicondyle, pain in the lateral epicondyle with resisted wrist extension..        Right  Wrist hand-       - Positive- Tinel's over the Median nerve  Positive- Phalen maneuver   Positive- Thenar atrophy   Negative- hypothenar atrophy   Positive- Median Nerve Compression test less than 30 seconds   Negative- Tinel's over Guyon's Canal   Negative- Tinel's over Cubital Tunnel Negative- Tinels over the Median Nerve overlying the antecubital  Fossa   Negative- Tinel's over Radial groove  Negative- Tinel's over sensory branch of Radial nerve at the wrist  Negative- Tinel's over the PIN   Negative- pain in forearm with resistance to             FDP flexion and resisted pronation   Positive- boggy synovium of the wrist   normal- less than 2 seconds capillary all digits  Positive- light touch intact in Median nerve distribution Positive- light touch in the Radial Nerve distribution  Positive- light touch intact in the Ulnar sensory nerve distribution    Positive- Thumb opposition strength  symmetrical  Negative- bruit(aneurysm)    Positive- skin color intact(claudication/Raynaud's)  Negative- cold digits(claudication/Raynaud's)  normal - Kamron Test(Vascular Exam)  normal- +2 Radial and Ulnar artery pulses  Negative- anatomical snuff box tenderness(Dequervain's Synovitis)  Negative- finger or thumb triggering(Trigger Thumb or Finger)  Negative- Lipoma  Negative- Ganglion cyst  Positive-Tinel's ulnar tunnel        Sensory exam-C5,C6,C7,C8,T1- normal     Wrist extension for radial nerve- +5/5  Wrist Flexion-+5/5  Wrist Ulnar Deviation-+5/5  Wrist Radial Deviation- +5/5  Resisted opposition of the thumb for the median nerve- +5/5  Digit abduction for the ulnar nerve- +5/5                     Assessment:  The patient continues to have persistent paresthesias and a positive Tinel's over the cubital tunnel.  She does understand that the ganglion cyst over the volar aspect of the proximal thenar eminence could be emanating from the carpal canal which can cause traction on the volar carpal ligament, which can cause the paresthesias in all 5 digits.  All the patient's questions were answered regarding her diagnosis and recommended treatment.  The patient understands that she would benefit from surgical intervention.  The risk and benefit of surgery has been discussed.            Diagnosis:              1.right lateral epicondylitis                2. Volar radial ganglion cyst                 3. Right carpal tunnel  syndrome                 4.  Right ulnar tunnel syndrome     Diagnostic Studies  MRI-No  X-Ray-No  EMG/NCV-yes, right upper extremity    Arthrogram-No  Bone Scan-No  CT Scan-No  Doppler-No  ESR-No  CRP-No  CBC with Diff-No   Rheumatoid/Arthritis Panel-No        Plan:                                                  1. PT-no                                                 2.OT-yes                                          3.NSAID-yes                                        4. Narcotics-no                                      5. Wound care-No                                 6. Rest-yes                                           7. Surgery-yes, right carpal tunnel release, volar ganglion cyst excision.  right cubital tunnel release                                         8. SHARYN Hose-no                                    9. Anticoagulation therapy-no               10. Elevation-no                                     11. Crutches-no                                    12. Walker-no             13. Cane no                        14. Referral-no                                     15.Injection-no                            16. Splint -none            17. RICE -none            18. Follow up- 3 weeks

## 2017-12-22 NOTE — ANESTHESIA PREPROCEDURE EVALUATION
12/22/2017  Fransisca Clifton is a 42 y.o., female.    Pre-op Assessment    I have reviewed the Patient Summary Reports.     I have reviewed the Nursing Notes.   I have reviewed the Medications.     Review of Systems  Anesthesia Hx:  No problems with previous Anesthesia  Denies Family Hx of Anesthesia complications.   Denies Personal Hx of Anesthesia complications.   Social:  Non-Smoker    Cardiovascular:  Cardiovascular Normal     Pulmonary:  Pulmonary Normal    Renal/:  Renal/ Normal     Hepatic/GI:  Hepatic/GI Normal    Musculoskeletal:   Arthritis     Neurological:   Neuromuscular Disease,    Endocrine:   Diabetes, well controlled, type 2        Physical Exam  General:  Obesity    Airway/Jaw/Neck:  Airway Findings: Mouth Opening: Normal General Airway Assessment: Adult  Mallampati: II       Chest/Lungs:  Chest/Lungs Findings: Clear to auscultation     Heart/Vascular:  Heart Findings: Rate: Normal  Rhythm: Regular Rhythm             Anesthesia Plan  Type of Anesthesia, risks & benefits discussed:  Anesthesia Type:  general  Patient's Preference:   Intra-op Monitoring Plan:   Intra-op Monitoring Plan Comments:   Post Op Pain Control Plan:   Post Op Pain Control Plan Comments:   Induction:   IV  Beta Blocker:  Patient is not currently on a Beta-Blocker (No further documentation required).       Informed Consent: Patient understands risks and agrees with Anesthesia plan.  Questions answered. Anesthesia consent signed with patient.  ASA Score: 2     Day of Surgery Review of History & Physical: I have interviewed and examined the patient. I have reviewed the patient's H&P dated:  There are no significant changes.

## 2017-12-22 NOTE — PLAN OF CARE
POC and discharge instructions discussed with pt & mother. Handouts given. Both verbalized understanding. VSS.

## 2017-12-22 NOTE — TRANSFER OF CARE
"Anesthesia Transfer of Care Note    Patient: Fransisca Clifton    Procedure(s) Performed: Procedure(s) (LRB):  GANGLIONECTOMY-HAND-right (Right)  RELEASE-CARPAL TUNNEL (Right)  RELEASE-CUBITAL TUNNEL (Right)    Patient location: PACU    Anesthesia Type: general    Transport from OR: Transported from OR on room air with adequate spontaneous ventilation    Post pain: adequate analgesia    Post assessment: no apparent anesthetic complications    Post vital signs: stable    Level of consciousness: awake    Nausea/Vomiting: no nausea/vomiting    Complications: none    Transfer of care protocol was followed      Last vitals:   Visit Vitals  /79 (BP Location: Right arm, Patient Position: Sitting)   Pulse 71   Temp 36.8 °C (98.2 °F) (Skin)   Resp 16   Ht 5' 6.5" (1.689 m)   Wt 73.4 kg (161 lb 13.1 oz)   SpO2 100%   Breastfeeding? No   BMI 25.73 kg/m²     "

## 2017-12-22 NOTE — DISCHARGE INSTRUCTIONS
General Information:  1.  Do not drink alcoholic beverages including beer for 24 hours or as long as you are on pain medication..  2.  Do not drive a motor vehicle, operate machinery or power tools, or signs legal papers for 24 hours or as long as you are on pain medication.   3.  You may experience light-headedness, dizziness, and sleepiness following surgery. Please do not stay alone. A responsible adult should be with you for this 24 hour period.  4.  Go home and rest.  5. Progress slowly to a normal diet unless instructed.  Otherwise, begin with liquids such as soft drinks, then soup and crackers working up to solid foods. Drink plenty of nonalcoholic fluids.  6.  Certain anesthetics and pain medications produce nausea and vomiting in certain       individuals. If nausea becomes a problem at home, call you doctor.  7. A nurse will be calling you sometime after surgery. Do not be alarmed. This is our way of finding out how you are doing.  8. Several times every hour while you are awake, take 2-3 deep breaths and cough. If you had stomach surgery hold a pillow or rolled towel firmly against your stomach before you cough. This will help with any pain the cough might cause.  9. Several times every hour while you are awake, pump and flex your feet 5-6 times and do foot circles. This will help prevent blood clots.  10.Call your doctor for severe pain, bleeding, fever, or signs or symptoms of infection (pain, swelling, redness, foul odor, drainage).  11.You can contact your doctor anytime by [phone] for the [facility] (at [facility]) or [phone] for the [facility] on [facility].   [email or web address] is another way to contact your doctor if you are an active participant online with [facility].

## 2017-12-23 NOTE — ANESTHESIA POSTPROCEDURE EVALUATION
"Anesthesia Post Evaluation    Patient: Fransisca Clifton    Procedure(s) Performed: Procedure(s) (LRB):  GANGLIONECTOMY-HAND-right (Right)  RELEASE-CARPAL TUNNEL (Right)  RELEASE-CUBITAL TUNNEL (Right)    Final Anesthesia Type: general  Patient location during evaluation: PACU  Patient participation: Yes- Able to Participate  Level of consciousness: awake and alert  Post-procedure vital signs: reviewed and stable  Pain management: adequate  Airway patency: patent  PONV status at discharge: No PONV  Anesthetic complications: no      Cardiovascular status: blood pressure returned to baseline  Respiratory status: unassisted and spontaneous ventilation  Hydration status: euvolemic  Follow-up not needed.        Visit Vitals  /70   Pulse 70   Temp 36.3 °C (97.3 °F) (Temporal)   Resp 16   Ht 5' 6.5" (1.689 m)   Wt 73.4 kg (161 lb 13.1 oz)   SpO2 99%   Breastfeeding? No   BMI 25.73 kg/m²       Pain/Mike Score: Pain Assessment Performed: Yes (12/22/2017  1:15 PM)  Presence of Pain: complains of pain/discomfort (12/22/2017  1:30 PM)  Pain Rating Prior to Med Admin: 5 (12/22/2017  2:37 PM)  Mike Score: 10 (12/22/2017  2:15 PM)      "

## 2017-12-28 ENCOUNTER — CLINICAL SUPPORT (OUTPATIENT)
Dept: REHABILITATION | Facility: HOSPITAL | Age: 42
End: 2017-12-28
Attending: ORTHOPAEDIC SURGERY
Payer: COMMERCIAL

## 2017-12-28 ENCOUNTER — PATIENT MESSAGE (OUTPATIENT)
Dept: ORTHOPEDICS | Facility: CLINIC | Age: 42
End: 2017-12-28

## 2017-12-28 DIAGNOSIS — G56.01 CARPAL TUNNEL SYNDROME OF RIGHT WRIST: Primary | ICD-10-CM

## 2017-12-28 NOTE — PROGRESS NOTES
Pt presented for evaluation with post op dressing in place. Spoke with nurses at surgeon's office as orders requested passive motion. Requested clarification of this as well as surgeon's preference for ganglionectomies of the wrist as well as multiple nerve compressions (i.e. Splinted or no splint.). Nurses advised for pt to attend post op visit on 1/4/2017 due to CuTR rather than beginning therapy this date. Instructed pt to attend post op visit and that eval will likely be scheduled at that time.    VICENTE Michele, KELLENT

## 2017-12-29 NOTE — DISCHARGE SUMMARY
[facility]  Brief Operative Note     SUMMARY     Surgery Date: 12/22/2017     Surgeon(s) and Role:     * Alistair Vincent Sr., MD - Primary    Assisting Surgeon: None    Pre-op Diagnosis:  Ganglion cyst [M67.40]  Ulnar nerve entrapment, unspecified laterality [G56.20]  Carpal tunnel syndrome, unspecified laterality [G56.00]    Post-op Diagnosis:  Post-Op Diagnosis Codes:     * Ganglion cyst [M67.40]     * Ulnar nerve entrapment, unspecified laterality [G56.20]     * Carpal tunnel syndrome, unspecified laterality [G56.00]    Procedure(s) (LRB):  GANGLIONECTOMY-HAND (Right)  RELEASE-CARPAL TUNNEL (Right)  RELEASE-CUBITAL TUNNEL (Right)  TENOSYNOVECTOMY (Right)    Anesthesia: General    Description of the findings of the procedure: * Ganglion cyst [M67.40]     * Ulnar nerve entrapment, unspecified laterality [G56.20]     * Carpal tunnel syndrome, unspecified laterality [G56.00]    Findings/Key Components: * Ganglion cyst [M67.40]     * Ulnar nerve entrapment, unspecified laterality [G56.20]     * Carpal tunnel syndrome, unspecified laterality [G56.00]    Estimated Blood Loss: 5 mL         Specimens:   Specimen (12h ago through future)    None          Discharge Note    SUMMARY     Admit Date: 12/22/2017    Discharge Date and Time:  12/22/2017    Hospital Course (synopsis of major diagnoses, care, treatment, and services provided during the course of the hospital stay):  Without complication    Final Diagnosis: Post-Op Diagnosis Codes:     * Ganglion cyst [M67.40]     * Ulnar nerve entrapment, unspecified laterality [G56.20]     * Carpal tunnel syndrome, unspecified laterality [G56.00]    Disposition: Home or Self Care    Follow Up/Patient Instructions: Discharge home.  Follow-up in 2 weeks.  Leave the splint in place.  Resume regular diet.    Medications: Dilantin.  Reconciled Home Medications:   Discharge Medication List as of 12/22/2017  3:02 PM      START taking these medications    Details    HYDROmorphone (DILAUDID) 2 MG tablet Take 1 tablet (2 mg total) by mouth every 4 (four) hours as needed for Pain., Starting Fri 12/22/2017, Print      meperidine (DEMEROL) 50 mg tablet Take 1 tablet (50 mg total) by mouth every 4 (four) hours as needed for Pain., Starting Fri 12/22/2017, Print         CONTINUE these medications which have NOT CHANGED    Details   acetaminophen-codeine 300-60mg (TYLENOL #4) 300-60 mg Tab Take by mouth every 12 (twelve) hours as needed., Starting Mon 1/30/2017, Historical Med      !! alprazolam (XANAX) 2 MG Tab Take 0.5 mg by mouth nightly as needed. , Until Discontinued, Historical Med      !! ALPRAZolam (XANAX) 2 MG Tab Take 1 mg by mouth nightly as needed., Historical Med      ergocalciferol (ERGOCALCIFEROL) 50,000 unit Cap Take 50,000 Units by mouth every 30 days., Starting Tue 5/5/2015, Historical Med      fluconazole (DIFLUCAN) 150 MG Tab Take by mouth as needed., Starting Mon 8/21/2017, Historical Med      meloxicam (MOBIC) 7.5 MG tablet Take 7.5 mg by mouth once daily., Starting Wed 8/16/2017, Until Thu 8/16/2018, Historical Med      metroNIDAZOLE (FLAGYL) 500 MG tablet Take 500 mg by mouth 2 (two) times daily as needed., Starting Mon 8/21/2017, Historical Med      nabumetone (RELAFEN) 500 MG tablet Take 1 tablet (500 mg total) by mouth once daily., Starting 1/16/2014, Until Discontinued, Normal      valACYclovir (VALTREX) 500 MG tablet Take 500 mg by mouth as needed., Starting Mon 11/30/2015, Historical Med       !! - Potential duplicate medications found. Please discuss with provider.          Discharge Procedure Orders  Referral to Occupational therapy   Referral Priority: Routine Referral Type: Occupational Therapy   Referral Reason: Specialty Services Required    Requested Specialty: Occupational Therapy    Number of Visits Requested: 1      Diet general     Call MD for:  temperature >100.4     Call MD for:  persistent nausea and vomiting     Call MD for:  severe  uncontrolled pain     Call MD for:  difficulty breathing, headache or visual disturbances     Call MD for:  redness, tenderness, or signs of infection (pain, swelling, redness, odor or green/yellow discharge around incision site)     Call MD for:  hives     Call MD for:  persistent dizziness or light-headedness     Call MD for:  extreme fatigue     Other restrictions (specify):       Follow-up Information     Call Alistair Vincent Sr, MD.    Specialty:  Orthopedic Surgery  Why:  As needed, If symptoms worsen, For wound re-check, For suture removal  Contact information:  [address]  [place] [address] [phone]

## 2017-12-30 ENCOUNTER — NURSE TRIAGE (OUTPATIENT)
Dept: ADMINISTRATIVE | Facility: CLINIC | Age: 42
End: 2017-12-30

## 2017-12-31 NOTE — TELEPHONE ENCOUNTER
"    Reason for Disposition   Caller has URGENT question and triager unable to answer question    Answer Assessment - Initial Assessment Questions  1. SYMPTOM: "What's the main symptom you're concerned about?" (e.g., pain, fever, vomiting)      Right arm itching  2. ONSET: "When did ________  start?"      Since surgery  3. SURGERY: "What surgery was performed?"      Ganglia cyst removal  4. DATE of SURGERY: "When was surgery performed?"       12/22  5. ANESTHESIA: " What type of anesthesia did you have?" (e.g., general, spinal, epidural, local)      general  6. PAIN: "Is there any pain?" If so, ask: "How bad is it?"  (Scale 1-10; or mild, moderate, severe)      no  7. FEVER: "Do you have a fever?" If so, ask: "What is your temperature, how was it measured, and when did it start?"      no  8. VOMITING: "Is there any vomiting?" If yes, ask: "How many times?"      no  9. BLEEDING: "Is there any bleeding?" If so, ask: "How much?" and "Where?"      no  10. OTHER SYMPTOMS: "Do you have any other symptoms?" (e.g., drainage from wound, painful urination, constipation)        Burning to right arm    Protocols used: ST POST-OP SYMPTOMS AND [id number]      "

## 2017-12-31 NOTE — TELEPHONE ENCOUNTER
Patient had a recent surgery to have ganglia removed from her right wrist and has since had itching to the entire right arm. Notified Dr. Navarro who stated is probably dressing or cleaning agent used during surgery. His recommendations include: minimizing the dressing, clean area with antibacterial soap and water and OTC Benadryl for relief. Advised the patient of physician's instructions and she verbalized understanding.

## 2018-01-01 NOTE — PATIENT INSTRUCTIONS
Carpal Tunnel Syndrome    Carpal tunnel syndrome is a painful condition of the wrist and arm. It is caused by pressure on the median nerve.  The median nerve is one of the nerves that give feeling and movement to the hand. It passes through a tunnel in the wrist called the carpal tunnel. This tunnel is made up of bones and ligaments. Narrowing of this tunnel or swelling of the tissues inside the tunnel puts pressure on the median nerve. This causes numbness, pins and needles, or electric shooting pains in your hand and forearm. Often the pain is worse at night and may wake you when you are asleep.  Carpal tunnel syndrome may occur during pregnancy and with use of birth control pills. It is more common in workers who must often bend their wrists. It is also common in people who work with power tools that cause strong vibrations.  Home care  · Rest the painful wrist. Avoid repeated bending of the wrist back and forth. This puts pressure on the median nerve. Avoid using power tools with strong vibrations.  · If you were given a splint, wear it at night while you sleep. You may also wear it during the day for comfort.  · Move your fingers and wrists often to avoid stiffness.  · Elevate your arms on pillows when you lie down.  · Try using the unaffected hand more.  · Try not to hold your wrists in a bent, downward position.  · Sometimes changes in the work place may ease symptoms. If you type most of the day, it may help to change the position of your keyboard or add a wrist support. Your wrist should be in a neutral position and not bent back when typing.  · You may use over-the-counter pain medicine to treat pain and inflammation, unless another medicine was prescribed. Anti-inflammatory pain medicines, such as ibuprofen or naproxen may be more effective than acetaminophen, which treats pain, but not inflammation. If you have chronic liver or kidney disease or ever had a stomach ulcer or GI bleeding, talk with your  doctor before using these medicines.  · Opioid pain medicine will only give temporary relief and does not treat the problem. If pain continues, you may need a shot of a steroid drug into your wrist.  · If the above methods fail, you may need surgery. This will open the carpal tunnel and release the pressure on the trapped nerve.  Follow-up care  Follow up with your healthcare provider, or as advised, if the pain doesnt begin to improve within the next week.  If X-rays were taken, you will be notified of any new findings that may affect your care.  When to seek medical advice  Call your healthcare provider right away if any of these occur:  · Pain not improving with the above treatment  · Fingers or hand become cold, blue, numb, or tingly  · Your whole arm becomes swollen or weak  Date Last Reviewed: 11/23/2015  © 6374-7904 [employer]. [address] [place] [address], [place] [address]. All rights reserved. This information is not intended as a substitute for professional medical care. Always follow your healthcare professional's instructions.         [id number]

## 2018-01-04 ENCOUNTER — PATIENT MESSAGE (OUTPATIENT)
Dept: ORTHOPEDICS | Facility: CLINIC | Age: 43
End: 2018-01-04

## 2018-01-04 ENCOUNTER — OFFICE VISIT (OUTPATIENT)
Dept: ORTHOPEDICS | Facility: CLINIC | Age: 43
End: 2018-01-04
Payer: COMMERCIAL

## 2018-01-04 VITALS
WEIGHT: 161 LBS | BODY MASS INDEX: 25.27 KG/M2 | HEIGHT: 67 IN | HEART RATE: 70 BPM | SYSTOLIC BLOOD PRESSURE: 104 MMHG | RESPIRATION RATE: 16 BRPM | DIASTOLIC BLOOD PRESSURE: 65 MMHG

## 2018-01-04 DIAGNOSIS — Z98.890 POSTOPERATIVE STATE: Primary | ICD-10-CM

## 2018-01-04 PROCEDURE — 99024 POSTOP FOLLOW-UP VISIT: CPT | Mod: S$GLB,,, | Performed by: ORTHOPAEDIC SURGERY

## 2018-01-04 PROCEDURE — 99999 PR PBB SHADOW E&M-EST. PATIENT-LVL III: CPT | Mod: PBBFAC,,, | Performed by: ORTHOPAEDIC SURGERY

## 2018-01-04 RX ORDER — NABUMETONE 500 MG/1
500 TABLET, FILM COATED ORAL
COMMUNITY
Start: 2014-01-16 | End: 2019-03-28

## 2018-01-04 RX ORDER — METRONIDAZOLE 500 MG/1
500 TABLET ORAL
COMMUNITY
Start: 2017-08-21 | End: 2019-03-28

## 2018-01-04 RX ORDER — FLUCONAZOLE 150 MG/1
TABLET ORAL
COMMUNITY
Start: 2017-08-21 | End: 2019-03-28

## 2018-01-04 NOTE — PROGRESS NOTES
SUBJECTIVE:  Patient is status post Right  Cubital tunnel release and carpal tunnel release with a  Ganglion cyst excision .  Patient complains of   3/10  pain that is better with the  pain meds and aggravated with movement.  The patient states that her symptoms radiating fromher elbow to her disolved.          Past Medical History:   Diagnosis Date    Anxiety     Arthralgia     Arthritis     Arthritis     osteo , and psoria    Diabetes mellitus type II     Panic attack      Past Surgical History:   Procedure Laterality Date    FOOT NEUROMA SURGERY Left     HYSTERECTOMY      partial    PELVIC LAPAROSCOPY      ROTATOR CUFF REPAIR Right     TUBAL LIGATION      ULNAR NERVE TRANSPOSITION Right      Review of patient's allergies indicates:   Allergen Reactions    Demerol [meperidine] Itching     Not able to urinate    Lortab [hydrocodone-acetaminophen] Itching     Hallucination    Fentanyl Itching     Erythema @ injection site    Iodine and iodide containing products Hives and Itching     Allergic , after CTR surgery noticed side affects/symptoms     Current Outpatient Prescriptions on File Prior to Visit   Medication Sig Dispense Refill    acetaminophen-codeine 300-60mg (TYLENOL #4) 300-60 mg Tab Take by mouth every 12 (twelve) hours as needed.      alprazolam (XANAX) 2 MG Tab Take 0.5 mg by mouth nightly as needed.       ALPRAZolam (XANAX) 2 MG Tab Take 1 mg by mouth nightly as needed.      ergocalciferol (ERGOCALCIFEROL) 50,000 unit Cap Take 50,000 Units by mouth every 30 days.      fluconazole (DIFLUCAN) 150 MG Tab Take by mouth as needed.  0    HYDROmorphone (DILAUDID) 2 MG tablet Take 1 tablet (2 mg total) by mouth every 4 (four) hours as needed for Pain. 90 tablet 0    meloxicam (MOBIC) 7.5 MG tablet Take 7.5 mg by mouth once daily.      meperidine (DEMEROL) 50 mg tablet Take 1 tablet (50 mg total) by mouth every 4 (four) hours as needed for Pain. 60 tablet 0    metroNIDAZOLE (FLAGYL) 500  "MG tablet Take 500 mg by mouth 2 (two) times daily as needed.  0    nabumetone (RELAFEN) 500 MG tablet Take 1 tablet (500 mg total) by mouth once daily. 30 tablet 2    valACYclovir (VALTREX) 500 MG tablet Take 500 mg by mouth as needed.       No current facility-administered medications on file prior to visit.      /65   Pulse 70   Resp 16   Ht 5' 6.5" (1.689 m)   Wt 73 kg (161 lb)   BMI 25.60 kg/m²    ROS:  GENERAL: No fever, chills, fatigability or weight loss.  SKIN: No rashes, itching or changes in color or texture of skin.  HEAD: No headaches or recent head trauma.  EYES: Visual acuity fine. No photophobia, ocular pain or diplopia.  EARS: Denies ear pain, discharge or vertigo.  NOSE: No loss of smell, no epistaxis or postnasal drip.  MOUTH & THROAT: No hoarseness or change in voice. No excessive gum bleeding.  NODES: Denies swollen glands.  CHEST: Denies COURTNEY, cyanosis, wheezing, cough and sputum production.  CARDIOVASCULAR: Denies chest pain, PND, orthopnea or reduced exercise tolerance.  ABDOMEN: Appetite fine. No weight loss. Denies diarrhea, abdominal pain, hematemesis or blood in stool.  URINARY: No flank pain, dysuria or hematuria.  PERIPHERAL VASCULAR: No claudication or cyanosis.  NEUROLOGIC: No history of seizures, paralysis, alteration of gait or coordination.  MUSCULOSKELETAL: See HPI    PE:  APPEARANCE: Well nourished, well developed, in no acute distress.   HEAD: Normocephalic, atraumatic.  EYES: PERRL. EOMI.   EARS: TM's intact. Light reflex normal. No retraction or perforation.   NOSE: Mucosa pink. Airway clear.  MOUTH & THROAT: No tonsillar enlargement. No pharyngeal erythema or exudate. No stridor.  NECK: Supple.   NODES: No cervical, axillary or inguinal lymph node enlargement.  CHEST: Lungs clear to auscultation.  CARDIOVASCULAR: Normal S1, S2. No rubs, murmurs or gallops.  ABDOMEN: Bowel sounds normal. Not distended. Soft. No tenderness or masses.  NEUROLOGIC: Cranial Nerves: " II-XII grossly intact, also see MUSCULOSKELETAL  MUSCULOSKELETAL:       Right  Elbow/ Foreram/ Wrist   Dressing intact, 2 plus radial  artery and ulnar artery pulses, light touch intact Right upper extremity.  All digits are warm. No erythema, no warmth, no drainage, No swelling, no significant tenderness.      ASSESSMENT:  The patient is stable and improving.      PLAN:  Continue pain medication  Continue wound care  Continue occupational therapy

## 2018-01-04 NOTE — PATIENT INSTRUCTIONS
Arthralgia    Arthralgia is the term for pain in or around the joint. It is a symptom, not a disease. This pain may involve one or more joints. In some cases, the pain moves from joint to joint.  There are many causes for joint pain. These include:  · Injury  · Osteoarthritis (wearing out of the joint surface)  · Gout (inflammation of the joint due to crystals in the joint fluid)  · Infection inside the joint    · Bursitis (inflammation of the fluid-filled sacs around the joint)  · Autoimmune disorders such as rheumatoid arthritis or lupus  · Tendonitis (inflammation of chords that attach muscle to bone)  Home care  · Rest the involved joint(s) until your symptoms improve.   · You may be prescribed pain medicine. If none is prescribed, you may use acetaminophen or ibuprofen to control pain and inflammation.  Follow-up care  Follow up with your healthcare provider or as advised.  When to seek medical advice  Contact your healthcare provider right away if any of the following occurs:  · Pain, swelling, or redness of joint increases  · Pain worsens or recurs after a period of improvement  · Pain moves to other joints  · You cannot bear weight on the affected joint   · You cannot move the affected joint  · Joint appears deformed  · New rash appears  · Fever of 100.4ºF (38ºC) or higher, or as directed by your healthcare provider  Date Last Reviewed: 3/1/2017  © 8901-2154 [employer]. [address] [place] [address], [place] [address]. All rights reserved. This information is not intended as a substitute for professional medical care. Always follow your healthcare professional's instructions.

## 2018-01-04 NOTE — LETTER
January 4, 2018      [facility]  [address] [facility] [place] [address]  Phone: [phone]  Fax: [phone]       Patient: Fransisca Clifton   YOB: 1975  Date of Visit: 01/04/2018    To Whom It May Concern:    Marito Clifton  was at [facility] on 01/04/2018. She may return to work/school on January 8,2018 with no restrictions. If you have any questions or concerns, or if I can be of further assistance, please do not hesitate to contact me.    Sincerely,    [employer]

## 2018-01-05 ENCOUNTER — PATIENT MESSAGE (OUTPATIENT)
Dept: ORTHOPEDICS | Facility: CLINIC | Age: 43
End: 2018-01-05

## 2018-01-09 ENCOUNTER — PATIENT MESSAGE (OUTPATIENT)
Dept: ORTHOPEDICS | Facility: CLINIC | Age: 43
End: 2018-01-09

## 2018-01-11 VITALS
SYSTOLIC BLOOD PRESSURE: 120 MMHG | WEIGHT: 161.81 LBS | TEMPERATURE: 97 F | RESPIRATION RATE: 16 BRPM | DIASTOLIC BLOOD PRESSURE: 70 MMHG | OXYGEN SATURATION: 99 % | HEART RATE: 70 BPM | BODY MASS INDEX: 25.4 KG/M2 | HEIGHT: 67 IN

## 2018-01-17 ENCOUNTER — NURSE TRIAGE (OUTPATIENT)
Dept: ADMINISTRATIVE | Facility: CLINIC | Age: 43
End: 2018-01-17

## 2018-01-17 ENCOUNTER — PATIENT MESSAGE (OUTPATIENT)
Dept: ORTHOPEDICS | Facility: CLINIC | Age: 43
End: 2018-01-17

## 2018-01-17 ENCOUNTER — TELEPHONE (OUTPATIENT)
Dept: ORTHOPEDICS | Facility: CLINIC | Age: 43
End: 2018-01-17

## 2018-01-17 NOTE — TELEPHONE ENCOUNTER
Reason for Disposition   Nursing judgment    Protocols used: ST NO PROTOCOL CALL: INFORMATION ONLY-A-OH    Pt reports that a blister has formed at the location where her sutures were for her carpel tunnel release surgery. Pt reports slight drainage. Denies fever or pain. Please contact patient to advise

## 2018-01-17 NOTE — TELEPHONE ENCOUNTER
Called pt back re: in basket message. She was concerned about incision drainage and requested to see Dr. Vincent. Appt scheduled.

## 2018-01-18 ENCOUNTER — TELEPHONE (OUTPATIENT)
Dept: ORTHOPEDICS | Facility: CLINIC | Age: 43
End: 2018-01-18

## 2018-01-18 NOTE — TELEPHONE ENCOUNTER
I have contacted the patient who expalined to me her suture site was oozing clear liquid and would dry yellow. The patient isn't c/o of pain, no discoloration, no smell. The patient hand is healing very well and she is okay, the patient will go to therapy today an have them cover it to alleviate any leaking. The patient has a f/u appt for next Thursday from her surgery as well with Dr. Medeiros. Pt verbalized understanding. -AS

## 2018-01-25 ENCOUNTER — OFFICE VISIT (OUTPATIENT)
Dept: ORTHOPEDICS | Facility: CLINIC | Age: 43
End: 2018-01-25
Payer: COMMERCIAL

## 2018-01-25 VITALS
HEIGHT: 67 IN | DIASTOLIC BLOOD PRESSURE: 69 MMHG | HEART RATE: 69 BPM | SYSTOLIC BLOOD PRESSURE: 109 MMHG | BODY MASS INDEX: 25.27 KG/M2 | WEIGHT: 161 LBS

## 2018-01-25 DIAGNOSIS — Z98.890 POSTOPERATIVE STATE: Primary | ICD-10-CM

## 2018-01-25 PROCEDURE — 99024 POSTOP FOLLOW-UP VISIT: CPT | Mod: S$GLB,,, | Performed by: ORTHOPAEDIC SURGERY

## 2018-01-25 PROCEDURE — 99999 PR PBB SHADOW E&M-EST. PATIENT-LVL III: CPT | Mod: PBBFAC,,, | Performed by: ORTHOPAEDIC SURGERY

## 2018-01-25 NOTE — PATIENT INSTRUCTIONS
Arthralgia    Arthralgia is the term for pain in or around the joint. It is a symptom, not a disease. This pain may involve one or more joints. In some cases, the pain moves from joint to joint.  There are many causes for joint pain. These include:  · Injury  · Osteoarthritis (wearing out of the joint surface)  · Gout (inflammation of the joint due to crystals in the joint fluid)  · Infection inside the joint    · Bursitis (inflammation of the fluid-filled sacs around the joint)  · Autoimmune disorders such as rheumatoid arthritis or lupus  · Tendonitis (inflammation of chords that attach muscle to bone)  Home care  · Rest the involved joint(s) until your symptoms improve.   · You may be prescribed pain medicine. If none is prescribed, you may use acetaminophen or ibuprofen to control pain and inflammation.  Follow-up care  Follow up with your healthcare provider or as advised.  When to seek medical advice  Contact your healthcare provider right away if any of the following occurs:  · Pain, swelling, or redness of joint increases  · Pain worsens or recurs after a period of improvement  · Pain moves to other joints  · You cannot bear weight on the affected joint   · You cannot move the affected joint  · Joint appears deformed  · New rash appears  · Fever of 100.4ºF (38ºC) or higher, or as directed by your healthcare provider  Date Last Reviewed: 3/1/2017  © 3550-1620 [employer]. [address] [place] [address], [place] [address]. All rights reserved. This information is not intended as a substitute for professional medical care. Always follow your healthcare professional's instructions.

## 2018-01-25 NOTE — PROGRESS NOTES
SUBJECTIVE:  Patient is status post Right  Cubital tunnel release and carpal tunnel release with a  Ganglion cyst excision .  Patient complains of   3/10  pain that is better with the  pain meds and aggravated with movement.  The patient states that her symptoms radiating fromher elbow to her disolved.          Past Medical History:   Diagnosis Date    Anxiety     Arthralgia     Arthritis     Arthritis     osteo , and psoria    Diabetes mellitus type II     Panic attack      Past Surgical History:   Procedure Laterality Date    FOOT NEUROMA SURGERY Left     HYSTERECTOMY      partial    PELVIC LAPAROSCOPY      ROTATOR CUFF REPAIR Right     TUBAL LIGATION      ULNAR NERVE TRANSPOSITION Right      Review of patient's allergies indicates:   Allergen Reactions    Demerol [meperidine] Itching     Not able to urinate    Lortab [hydrocodone-acetaminophen] Itching     Hallucination    Fentanyl Itching     Erythema @ injection site    Iodine and iodide containing products Hives and Itching     Allergic , after CTR surgery noticed side affects/symptoms     Current Outpatient Prescriptions on File Prior to Visit   Medication Sig Dispense Refill    acetaminophen-codeine 300-60mg (TYLENOL #4) 300-60 mg Tab Take by mouth every 12 (twelve) hours as needed.      alprazolam (XANAX) 2 MG Tab Take 0.5 mg by mouth nightly as needed.       ALPRAZolam (XANAX) 2 MG Tab Take 1 mg by mouth nightly as needed.      ergocalciferol (ERGOCALCIFEROL) 50,000 unit Cap Take 50,000 Units by mouth every 30 days.      fluconazole (DIFLUCAN) 150 MG Tab Take by mouth as needed.  0    fluconazole (DIFLUCAN) 150 MG Tab Take by mouth.      HYDROmorphone (DILAUDID) 2 MG tablet Take 1 tablet (2 mg total) by mouth every 4 (four) hours as needed for Pain. 90 tablet 0    meloxicam (MOBIC) 7.5 MG tablet Take 7.5 mg by mouth once daily.      meperidine (DEMEROL) 50 mg tablet Take 1 tablet (50 mg total) by mouth every 4 (four) hours as  "needed for Pain. 60 tablet 0    metroNIDAZOLE (FLAGYL) 500 MG tablet Take 500 mg by mouth 2 (two) times daily as needed.  0    metroNIDAZOLE (FLAGYL) 500 MG tablet Take 500 mg by mouth.      nabumetone (RELAFEN) 500 MG tablet Take 1 tablet (500 mg total) by mouth once daily. 30 tablet 2    nabumetone (RELAFEN) 500 MG tablet Take 500 mg by mouth.      secukinumab 150 mg/mL PnIj Inject 150 mg into the skin.      valACYclovir (VALTREX) 500 MG tablet Take 500 mg by mouth as needed.       No current facility-administered medications on file prior to visit.      /69   Pulse 69   Ht 5' 6.5" (1.689 m)   Wt 73 kg (161 lb)   BMI 25.60 kg/m²    ROS:  GENERAL: No fever, chills, fatigability or weight loss.  SKIN: No rashes, itching or changes in color or texture of skin.  HEAD: No headaches or recent head trauma.  EYES: Visual acuity fine. No photophobia, ocular pain or diplopia.  EARS: Denies ear pain, discharge or vertigo.  NOSE: No loss of smell, no epistaxis or postnasal drip.  MOUTH & THROAT: No hoarseness or change in voice. No excessive gum bleeding.  NODES: Denies swollen glands.  CHEST: Denies COURTNEY, cyanosis, wheezing, cough and sputum production.  CARDIOVASCULAR: Denies chest pain, PND, orthopnea or reduced exercise tolerance.  ABDOMEN: Appetite fine. No weight loss. Denies diarrhea, abdominal pain, hematemesis or blood in stool.  URINARY: No flank pain, dysuria or hematuria.  PERIPHERAL VASCULAR: No claudication or cyanosis.  NEUROLOGIC: No history of seizures, paralysis, alteration of gait or coordination.  MUSCULOSKELETAL: See HPI    PE:  APPEARANCE: Well nourished, well developed, in no acute distress.   HEAD: Normocephalic, atraumatic.  EYES: PERRL. EOMI.   EARS: TM's intact. Light reflex normal. No retraction or perforation.   NOSE: Mucosa pink. Airway clear.  MOUTH & THROAT: No tonsillar enlargement. No pharyngeal erythema or exudate. No stridor.  NECK: Supple.   NODES: No cervical, axillary or " inguinal lymph node enlargement.  CHEST: Lungs clear to auscultation.  CARDIOVASCULAR: Normal S1, S2. No rubs, murmurs or gallops.  ABDOMEN: Bowel sounds normal. Not distended. Soft. No tenderness or masses.  NEUROLOGIC: Cranial Nerves: II-XII grossly intact, also see MUSCULOSKELETAL  MUSCULOSKELETAL:       Right  Elbow/ Foreram/ Wrist  2 plus radial  artery and ulnar artery pulses, light touch intact Right upper extremity.  All digits are warm. No erythema, no warmth, no drainage, No swelling, no significant tenderness.      ASSESSMENT:  The patient is stable and improving.      PLAN:    Continue pain medication  Continue wound care  Continue occupational therapy

## 2018-04-27 ENCOUNTER — TELEPHONE (OUTPATIENT)
Dept: ORTHOPEDICS | Facility: CLINIC | Age: 43
End: 2018-04-27

## 2018-04-27 NOTE — TELEPHONE ENCOUNTER
Called the patient to reschedule her appointment on May 1,2018 for another day due to the fact that Dr. Vincent will be out of the clinic that day. He will be in surgery that day.Left a message for her to give us a call back. FP

## 2019-03-14 ENCOUNTER — PATIENT OUTREACH (OUTPATIENT)
Dept: ADMINISTRATIVE | Facility: HOSPITAL | Age: 44
End: 2019-03-14

## 2019-03-28 ENCOUNTER — OFFICE VISIT (OUTPATIENT)
Dept: FAMILY MEDICINE | Facility: CLINIC | Age: 44
End: 2019-03-28
Payer: COMMERCIAL

## 2019-03-28 VITALS
TEMPERATURE: 99 F | DIASTOLIC BLOOD PRESSURE: 64 MMHG | SYSTOLIC BLOOD PRESSURE: 130 MMHG | HEIGHT: 67 IN | HEART RATE: 89 BPM | BODY MASS INDEX: 24.81 KG/M2 | WEIGHT: 158.06 LBS | RESPIRATION RATE: 18 BRPM | OXYGEN SATURATION: 96 %

## 2019-03-28 DIAGNOSIS — K80.20 GALLSTONES: ICD-10-CM

## 2019-03-28 DIAGNOSIS — R06.9 ABNORMALITY OF BREATHING: Primary | ICD-10-CM

## 2019-03-28 DIAGNOSIS — R53.83 FATIGUE, UNSPECIFIED TYPE: ICD-10-CM

## 2019-03-28 PROCEDURE — 99204 PR OFFICE/OUTPT VISIT, NEW, LEVL IV, 45-59 MIN: ICD-10-PCS | Mod: S$GLB,,, | Performed by: FAMILY MEDICINE

## 2019-03-28 PROCEDURE — 99999 PR PBB SHADOW E&M-EST. PATIENT-LVL V: CPT | Mod: PBBFAC,,, | Performed by: FAMILY MEDICINE

## 2019-03-28 PROCEDURE — 99204 OFFICE O/P NEW MOD 45 MIN: CPT | Mod: S$GLB,,, | Performed by: FAMILY MEDICINE

## 2019-03-28 PROCEDURE — 3008F PR BODY MASS INDEX (BMI) DOCUMENTED: ICD-10-PCS | Mod: CPTII,S$GLB,, | Performed by: FAMILY MEDICINE

## 2019-03-28 PROCEDURE — 99999 PR PBB SHADOW E&M-EST. PATIENT-LVL V: ICD-10-PCS | Mod: PBBFAC,,, | Performed by: FAMILY MEDICINE

## 2019-03-28 PROCEDURE — 3008F BODY MASS INDEX DOCD: CPT | Mod: CPTII,S$GLB,, | Performed by: FAMILY MEDICINE

## 2019-03-28 NOTE — PROGRESS NOTES
Fransisca Clifton    Chief Complaint   Patient presents with    Establish Care       History of Present Illness:   Mr. Clifton comes in today as a new patient to establish care with me.    She states she saw Dr. Dulce Whittington, gastroenterologist, recently for evaluation of chronic constipation and had abdominal ultrasound performed on February 4, 2019 which showed gallstones, multiple benign liver cyst, right kidney cyst with 1 [id number] ultrasound recommended to check for stability.    She states she saw Dr. Burch, pain management specialist, in December 2018 for surveillance of cervical and lumbar disc disease; she states she follows with pain management 3-4 times per year.    She also states she follows with Dr. John Daniels, rheumatologist, for psoriatic arthropathy with last visit on February 22, 2019.    She complains of having fatigue for 6 months.  She also reports having polyphagia.    She states for 2 months she has been experiencing new symptom-feels heavy in her throat with breathing and states if she lies flat feels that she cannot breathe. She states she saw Dr. Kaba, cardiologist, on February 27, 2019 and states she was told cardiac workup was unremarkable.    Otherwise, she denies having fever, chills, appetite changes; cough, wheezing; chest pain, palpitations, leg swelling; abdominal pain, nausea, vomiting, diarrhea; polydipsia, polyuria; unusual urinary symptoms; headaches; anxiety, depression, homicidal or suicidal thoughts.          Past Medical History:  No date: Anxiety  No date: Arthralgia  No date: Arthritis  No date: Arthritis      Comment:  osteo , and psoria  No date: Diabetes mellitus type II  No date: Gall stones  No date: High cholesterol      Comment:  treated in with medication in the past  No date: Panic attack      Current Outpatient Medications   Medication Sig    acetaminophen-codeine 300-60mg (TYLENOL #4) 300-60 mg Tab Take by mouth every 12 (twelve) hours as  needed.    ALPRAZolam (XANAX) 2 MG Tab Take 1 mg by mouth nightly as needed.    ergocalciferol (ERGOCALCIFEROL) 50,000 unit Cap Take 50,000 Units by mouth every 30 days.    fluconazole (DIFLUCAN) 150 MG Tab Take by mouth as needed.    metroNIDAZOLE (FLAGYL) 500 MG tablet Take 500 mg by mouth 2 (two) times daily as needed.    nabumetone (RELAFEN) 500 MG tablet Take 1 tablet (500 mg total) by mouth once daily.    valACYclovir (VALTREX) 500 MG tablet Take 500 mg by mouth as needed.       Review of Systems   Constitutional: Positive for fatigue. Negative for appetite change, chills and fever.   Respiratory: Positive for shortness of breath. Negative for cough and wheezing.    Cardiovascular: Negative for chest pain, palpitations and leg swelling.   Gastrointestinal: Positive for constipation. Negative for abdominal pain, diarrhea, nausea and vomiting.   Endocrine: Positive for polyphagia. Negative for polydipsia and polyuria.   Genitourinary: Negative for difficulty urinating.   Musculoskeletal: Negative for back pain.   Skin:        See history of present illness.   Neurological: Negative for headaches.   Psychiatric/Behavioral: Negative for dysphoric mood and suicidal ideas. The patient is not nervous/anxious.        Objective:  Physical Exam   Constitutional: She is oriented to person, place, and time. She appears well-developed and well-nourished. No distress.   Pleasant.   Neck: Normal range of motion. Neck supple. No thyromegaly present.   Cardiovascular: Normal rate, regular rhythm, normal heart sounds and intact distal pulses.   No murmur heard.  Pulmonary/Chest: Effort normal and breath sounds normal. No stridor. No respiratory distress. She has no wheezes.   Abdominal: Soft. Bowel sounds are normal. She exhibits no distension and no mass. There is no tenderness. There is no guarding.   Musculoskeletal: Normal range of motion. She exhibits no edema or tenderness.   She is ambulatory without problems.    Lymphadenopathy:     She has no cervical adenopathy.   Neurological: She is alert and oriented to person, place, and time.   Skin: Skin is warm and dry. She is not diaphoretic.   Psychiatric: She has a normal mood and affect. Her behavior is normal. Judgment and thought content normal.   Vitals reviewed.      ASSESSMENT:  1. Abnormality of breathing    2. Gallstones    3. Fatigue, unspecified type        PLAN:  Fransisca was seen today for establish care.    Diagnoses and all orders for this visit:    Abnormality of breathing  -     Ambulatory referral to Pulmonology    Gallstones  -     Ambulatory referral to General Surgery    Fatigue, unspecified type  -     Hemoglobin A1c; Future  -     Lipid panel; Future  -     Comprehensive metabolic panel; Future  -     TSH; Future  -     CBC auto differential; Future  -     Vitamin D; Future    Other orders  -     Cancel: Lipid panel; Future  -     Cancel: Mammo Digital Screening Bilateral With CAD; Future    Patient advised to call for results/follow up recommendations.  Continue current medications, follow low sodium, low cholesterol, low carb diet, daily walks.  Keep follow up with specialists.  Follow up if symptoms worsen or fail to improve.

## 2019-03-28 NOTE — LETTER
March 28, 2019      [facility]  [address] [address]  Phone: [phone]  Fax: [phone]       Patient: Fransisca Clifton   YOB: 1975  Date of Visit: 03/28/2019    To Whom It May Concern:    Marito Clifton  was at [facility] on 03/28/2019. She may return to work on 03/28/2019 with no restrictions. If you have any questions or concerns, or if I can be of further assistance, please do not hesitate to contact me.    Sincerely,    Milena Miller MD

## 2019-04-26 ENCOUNTER — TELEPHONE (OUTPATIENT)
Dept: PULMONOLOGY | Facility: CLINIC | Age: 44
End: 2019-04-26

## 2019-04-26 DIAGNOSIS — R06.02 SOB (SHORTNESS OF BREATH): Primary | ICD-10-CM

## 2019-04-29 ENCOUNTER — TELEPHONE (OUTPATIENT)
Dept: PULMONOLOGY | Facility: CLINIC | Age: 44
End: 2019-04-29

## 2019-04-29 NOTE — TELEPHONE ENCOUNTER
Pt accepted [facility] appointment on 5/16/2019 at 1400 with Dr Rodriguez.  Pt verbalized understanding.

## 2020-12-28 ENCOUNTER — TELEPHONE (OUTPATIENT)
Dept: OBSTETRICS AND GYNECOLOGY | Facility: CLINIC | Age: 45
End: 2020-12-28

## 2020-12-28 NOTE — TELEPHONE ENCOUNTER
----- Message from Anel Jhaveri sent at 12/28/2020  4:15 PM CST -----  Regarding: pregnacy  Type:  Sooner Apoointment Request    Caller is requesting a sooner appointment.  Caller declined first available appointment listed below.  Caller will not accept being placed on the waitlist and is requesting a message be sent to doctor.  Name of Caller:pt  When is the first available appointment?n/a  Symptoms:n/a  Would the patient rather a call back or a response via [facility]? Call back  Best Call Back Number [phone]  Additional Information: Please call back.Thanks

## 2020-12-28 NOTE — TELEPHONE ENCOUNTER
Called patient and daughter in law is pregnant and she is asking if she will be able to see JA.  Advised patient that daughter in law can call for appointment and she will be able to see JA.

## 2021-03-08 NOTE — TELEPHONE ENCOUNTER
----- Message from Akila Trent sent at 12/11/2017  1:36 PM CST -----  Please call pt back at [phone] in regards to la papers and surgery.  
Returned pt phone call. Advised pt her FMLA paperwork would be ready tomorrow. Pt vocalized understadning. Pt pre op labs were scheduled for 12/12/17.   
No

## 2021-06-08 ENCOUNTER — TELEPHONE (OUTPATIENT)
Dept: UROLOGY | Facility: CLINIC | Age: 46
End: 2021-06-08

## (undated) DEVICE — SEE MEDLINE ITEM 152622

## (undated) DEVICE — DRAPE PLASTIC U 60X72

## (undated) DEVICE — SEE MEDLINE ITEM 157027

## (undated) DEVICE — GLOVE 8.5 PROTEXIS PI BLUE

## (undated) DEVICE — SEE MEDLINE ITEM 157166

## (undated) DEVICE — SUT ETHILON 3-0 PS2 18 BLK

## (undated) DEVICE — ELECTRODE REM PLYHSV RETURN 9

## (undated) DEVICE — SEE L#120831

## (undated) DEVICE — SOL NS 1000CC

## (undated) DEVICE — DRAIN PENRS STERILE LTX 18X1/2

## (undated) DEVICE — SEE MEDLINE ITEM 154981

## (undated) DEVICE — MANIFOLD 4 PORT

## (undated) DEVICE — SEE MEDLINE ITEM 157216

## (undated) DEVICE — BANDAGE CONFORM 3IN STRL

## (undated) DEVICE — GLOVE 8 PROTEXIS PI BLUE

## (undated) DEVICE — COVER OVERHEAD SURG LT BLUE

## (undated) DEVICE — TOURNIQUET SB QC DP 18X4IN

## (undated) DEVICE — GLOVE PROTEXIS HYDROGEL SZ8

## (undated) DEVICE — GAUZE SPONGE 4X4 12PLY

## (undated) DEVICE — GLOVE PROTEXIS HYDROGEL SZ7.5

## (undated) DEVICE — SEE MEDLINE ITEM 157117